# Patient Record
Sex: MALE | Race: WHITE | Employment: PART TIME | URBAN - NONMETROPOLITAN AREA
[De-identification: names, ages, dates, MRNs, and addresses within clinical notes are randomized per-mention and may not be internally consistent; named-entity substitution may affect disease eponyms.]

---

## 2017-01-21 DIAGNOSIS — E78.00 PURE HYPERCHOLESTEROLEMIA: ICD-10-CM

## 2017-01-23 RX ORDER — ATORVASTATIN CALCIUM 10 MG/1
10 TABLET, FILM COATED ORAL DAILY
Qty: 30 TABLET | Refills: 0 | Status: SHIPPED | OUTPATIENT
Start: 2017-01-23 | End: 2017-02-23 | Stop reason: SDUPTHER

## 2017-01-31 ENCOUNTER — OFFICE VISIT (OUTPATIENT)
Dept: FAMILY MEDICINE CLINIC | Age: 27
End: 2017-01-31

## 2017-01-31 VITALS
WEIGHT: 202 LBS | BODY MASS INDEX: 33.66 KG/M2 | SYSTOLIC BLOOD PRESSURE: 124 MMHG | HEART RATE: 108 BPM | HEIGHT: 65 IN | OXYGEN SATURATION: 98 % | DIASTOLIC BLOOD PRESSURE: 74 MMHG

## 2017-01-31 DIAGNOSIS — F60.9 PERSONALITY DISORDER (HCC): ICD-10-CM

## 2017-01-31 DIAGNOSIS — Z13.1 DIABETES MELLITUS SCREENING: Primary | ICD-10-CM

## 2017-01-31 DIAGNOSIS — F33.2 MAJOR DEPRESSIVE DISORDER, RECURRENT SEVERE WITHOUT PSYCHOTIC FEATURES (HCC): ICD-10-CM

## 2017-01-31 DIAGNOSIS — K59.02 CONSTIPATION DUE TO OUTLET DYSFUNCTION: ICD-10-CM

## 2017-01-31 DIAGNOSIS — E78.2 MIXED HYPERLIPIDEMIA: ICD-10-CM

## 2017-01-31 DIAGNOSIS — F39 MOOD DISORDER (HCC): ICD-10-CM

## 2017-01-31 DIAGNOSIS — R15.9 ENCOPRESIS WITH CONSTIPATION AND OVERFLOW INCONTINENCE: ICD-10-CM

## 2017-01-31 LAB
ALBUMIN SERPL-MCNC: 4.6 G/DL (ref 3.4–5)
ALP BLD-CCNC: 62 U/L (ref 40–129)
ALT SERPL-CCNC: 65 U/L (ref 10–40)
AST SERPL-CCNC: 24 U/L (ref 15–37)
BILIRUB SERPL-MCNC: 0.8 MG/DL (ref 0–1)
BILIRUBIN DIRECT: <0.2 MG/DL (ref 0–0.3)
BILIRUBIN, INDIRECT: ABNORMAL MG/DL (ref 0–1)
CHOLESTEROL, TOTAL: 135 MG/DL (ref 0–199)
GLUCOSE BLD-MCNC: 91 MG/DL (ref 70–99)
HDLC SERPL-MCNC: 46 MG/DL (ref 40–60)
LDL CHOLESTEROL CALCULATED: 68 MG/DL
TOTAL PROTEIN: 6.7 G/DL (ref 6.4–8.2)
TRIGL SERPL-MCNC: 105 MG/DL (ref 0–150)
VLDLC SERPL CALC-MCNC: 21 MG/DL

## 2017-01-31 PROCEDURE — G8427 DOCREV CUR MEDS BY ELIG CLIN: HCPCS | Performed by: FAMILY MEDICINE

## 2017-01-31 PROCEDURE — 36415 COLL VENOUS BLD VENIPUNCTURE: CPT | Performed by: FAMILY MEDICINE

## 2017-01-31 PROCEDURE — G8419 CALC BMI OUT NRM PARAM NOF/U: HCPCS | Performed by: FAMILY MEDICINE

## 2017-01-31 PROCEDURE — 99214 OFFICE O/P EST MOD 30 MIN: CPT | Performed by: FAMILY MEDICINE

## 2017-01-31 PROCEDURE — 4004F PT TOBACCO SCREEN RCVD TLK: CPT | Performed by: FAMILY MEDICINE

## 2017-01-31 PROCEDURE — G0444 DEPRESSION SCREEN ANNUAL: HCPCS | Performed by: FAMILY MEDICINE

## 2017-01-31 PROCEDURE — G8484 FLU IMMUNIZE NO ADMIN: HCPCS | Performed by: FAMILY MEDICINE

## 2017-01-31 RX ORDER — DEXTROAMPHETAMINE SACCHARATE, AMPHETAMINE ASPARTATE, DEXTROAMPHETAMINE SULFATE AND AMPHETAMINE SULFATE 7.5; 7.5; 7.5; 7.5 MG/1; MG/1; MG/1; MG/1
30 TABLET ORAL DAILY
COMMUNITY

## 2017-01-31 ASSESSMENT — PATIENT HEALTH QUESTIONNAIRE - PHQ9
SUM OF ALL RESPONSES TO PHQ QUESTIONS 1-9: 11
6. FEELING BAD ABOUT YOURSELF - OR THAT YOU ARE A FAILURE OR HAVE LET YOURSELF OR YOUR FAMILY DOWN: 2
9. THOUGHTS THAT YOU WOULD BE BETTER OFF DEAD, OR OF HURTING YOURSELF: 0
7. TROUBLE CONCENTRATING ON THINGS, SUCH AS READING THE NEWSPAPER OR WATCHING TELEVISION: 1
5. POOR APPETITE OR OVEREATING: 0
8. MOVING OR SPEAKING SO SLOWLY THAT OTHER PEOPLE COULD HAVE NOTICED. OR THE OPPOSITE, BEING SO FIGETY OR RESTLESS THAT YOU HAVE BEEN MOVING AROUND A LOT MORE THAN USUAL: 0
SUM OF ALL RESPONSES TO PHQ9 QUESTIONS 1 & 2: 5
3. TROUBLE FALLING OR STAYING ASLEEP: 0
10. IF YOU CHECKED OFF ANY PROBLEMS, HOW DIFFICULT HAVE THESE PROBLEMS MADE IT FOR YOU TO DO YOUR WORK, TAKE CARE OF THINGS AT HOME, OR GET ALONG WITH OTHER PEOPLE: 2
2. FEELING DOWN, DEPRESSED OR HOPELESS: 3
1. LITTLE INTEREST OR PLEASURE IN DOING THINGS: 2
4. FEELING TIRED OR HAVING LITTLE ENERGY: 3

## 2017-02-16 ENCOUNTER — TELEPHONE (OUTPATIENT)
Dept: FAMILY MEDICINE CLINIC | Age: 27
End: 2017-02-16

## 2017-02-23 DIAGNOSIS — E78.00 PURE HYPERCHOLESTEROLEMIA: ICD-10-CM

## 2017-02-24 RX ORDER — ATORVASTATIN CALCIUM 10 MG/1
10 TABLET, FILM COATED ORAL DAILY
Qty: 30 TABLET | Refills: 11 | Status: SHIPPED | OUTPATIENT
Start: 2017-02-24 | End: 2018-02-17 | Stop reason: SDUPTHER

## 2017-04-12 ENCOUNTER — OFFICE VISIT (OUTPATIENT)
Dept: FAMILY MEDICINE CLINIC | Age: 27
End: 2017-04-12

## 2017-04-12 VITALS
SYSTOLIC BLOOD PRESSURE: 118 MMHG | DIASTOLIC BLOOD PRESSURE: 84 MMHG | OXYGEN SATURATION: 97 % | HEIGHT: 65 IN | WEIGHT: 206 LBS | HEART RATE: 98 BPM | BODY MASS INDEX: 34.32 KG/M2

## 2017-04-12 DIAGNOSIS — T14.8XXA MUSCLE STRAIN: Primary | ICD-10-CM

## 2017-04-12 PROCEDURE — G8417 CALC BMI ABV UP PARAM F/U: HCPCS | Performed by: NURSE PRACTITIONER

## 2017-04-12 PROCEDURE — 99213 OFFICE O/P EST LOW 20 MIN: CPT | Performed by: NURSE PRACTITIONER

## 2017-04-12 PROCEDURE — 4004F PT TOBACCO SCREEN RCVD TLK: CPT | Performed by: NURSE PRACTITIONER

## 2017-04-12 PROCEDURE — G8427 DOCREV CUR MEDS BY ELIG CLIN: HCPCS | Performed by: NURSE PRACTITIONER

## 2017-04-12 RX ORDER — FLUOXETINE HYDROCHLORIDE 20 MG/1
20 CAPSULE ORAL DAILY
Refills: 2 | COMMUNITY
Start: 2017-03-29

## 2017-04-12 RX ORDER — ARIPIPRAZOLE 15 MG/1
15 TABLET ORAL DAILY
COMMUNITY
Start: 2017-04-10 | End: 2017-04-12 | Stop reason: SDUPTHER

## 2017-04-12 RX ORDER — IBUPROFEN 400 MG/1
400 TABLET ORAL EVERY 6 HOURS PRN
Qty: 120 TABLET | Refills: 3 | Status: SHIPPED | OUTPATIENT
Start: 2017-04-12 | End: 2017-09-29 | Stop reason: SDUPTHER

## 2017-04-12 RX ORDER — HYDROXYZINE PAMOATE 50 MG/1
50 CAPSULE ORAL DAILY
Refills: 0 | COMMUNITY
Start: 2017-03-15 | End: 2019-12-09

## 2017-04-12 ASSESSMENT — ENCOUNTER SYMPTOMS
WHEEZING: 0
CONSTIPATION: 0
COUGH: 0
NAUSEA: 0
DIARRHEA: 0
VOMITING: 0
BLURRED VISION: 0

## 2017-08-01 ENCOUNTER — OFFICE VISIT (OUTPATIENT)
Dept: FAMILY MEDICINE CLINIC | Age: 27
End: 2017-08-01

## 2017-08-01 VITALS
DIASTOLIC BLOOD PRESSURE: 74 MMHG | HEIGHT: 65 IN | WEIGHT: 205.2 LBS | HEART RATE: 86 BPM | OXYGEN SATURATION: 98 % | BODY MASS INDEX: 34.19 KG/M2 | SYSTOLIC BLOOD PRESSURE: 124 MMHG

## 2017-08-01 DIAGNOSIS — K59.02 CONSTIPATION DUE TO OUTLET DYSFUNCTION: ICD-10-CM

## 2017-08-01 DIAGNOSIS — Z72.0 TOBACCO ABUSE: Primary | ICD-10-CM

## 2017-08-01 DIAGNOSIS — F33.2 MAJOR DEPRESSIVE DISORDER, RECURRENT SEVERE WITHOUT PSYCHOTIC FEATURES (HCC): ICD-10-CM

## 2017-08-01 DIAGNOSIS — J30.89 NON-SEASONAL ALLERGIC RHINITIS, UNSPECIFIED ALLERGIC RHINITIS TRIGGER: ICD-10-CM

## 2017-08-01 DIAGNOSIS — Z23 NEED FOR PROPHYLACTIC VACCINATION AGAINST STREPTOCOCCUS PNEUMONIAE (PNEUMOCOCCUS): ICD-10-CM

## 2017-08-01 DIAGNOSIS — F70 MENTAL RETARDATION, MILD (I.Q. 50-70): ICD-10-CM

## 2017-08-01 DIAGNOSIS — E78.2 MIXED HYPERLIPIDEMIA: ICD-10-CM

## 2017-08-01 DIAGNOSIS — Z11.4 SCREENING FOR HIV WITHOUT PRESENCE OF RISK FACTORS: ICD-10-CM

## 2017-08-01 DIAGNOSIS — F60.9 PERSONALITY DISORDER (HCC): ICD-10-CM

## 2017-08-01 DIAGNOSIS — R15.9 ENCOPRESIS WITH CONSTIPATION AND OVERFLOW INCONTINENCE: ICD-10-CM

## 2017-08-01 PROCEDURE — 99214 OFFICE O/P EST MOD 30 MIN: CPT | Performed by: FAMILY MEDICINE

## 2017-08-01 PROCEDURE — 4004F PT TOBACCO SCREEN RCVD TLK: CPT | Performed by: FAMILY MEDICINE

## 2017-08-01 PROCEDURE — G8427 DOCREV CUR MEDS BY ELIG CLIN: HCPCS | Performed by: FAMILY MEDICINE

## 2017-08-01 PROCEDURE — G8417 CALC BMI ABV UP PARAM F/U: HCPCS | Performed by: FAMILY MEDICINE

## 2017-08-01 RX ORDER — LORATADINE 10 MG/1
10 TABLET ORAL DAILY
Qty: 30 TABLET | Refills: 11 | Status: SHIPPED | OUTPATIENT
Start: 2017-08-01 | End: 2018-07-07 | Stop reason: SDUPTHER

## 2017-08-01 RX ORDER — ARIPIPRAZOLE 15 MG/1
15 TABLET ORAL DAILY
COMMUNITY

## 2017-09-29 DIAGNOSIS — T14.8XXA MUSCLE STRAIN: ICD-10-CM

## 2017-09-29 RX ORDER — IBUPROFEN 400 MG/1
400 TABLET ORAL EVERY 6 HOURS PRN
Qty: 120 TABLET | Refills: 3 | Status: SHIPPED | OUTPATIENT
Start: 2017-09-29

## 2017-11-30 ENCOUNTER — OFFICE VISIT (OUTPATIENT)
Dept: FAMILY MEDICINE CLINIC | Age: 27
End: 2017-11-30

## 2017-11-30 VITALS
WEIGHT: 210 LBS | BODY MASS INDEX: 34.99 KG/M2 | SYSTOLIC BLOOD PRESSURE: 114 MMHG | HEART RATE: 100 BPM | DIASTOLIC BLOOD PRESSURE: 82 MMHG | OXYGEN SATURATION: 98 % | HEIGHT: 65 IN

## 2017-11-30 DIAGNOSIS — E66.09 CLASS 1 OBESITY DUE TO EXCESS CALORIES WITH SERIOUS COMORBIDITY AND BODY MASS INDEX (BMI) OF 34.0 TO 34.9 IN ADULT: ICD-10-CM

## 2017-11-30 DIAGNOSIS — Z13.220 SCREENING FOR CHOLESTEROL LEVEL: ICD-10-CM

## 2017-11-30 DIAGNOSIS — E78.2 MIXED HYPERLIPIDEMIA: ICD-10-CM

## 2017-11-30 DIAGNOSIS — Z13.29 SCREENING FOR THYROID DISORDER: ICD-10-CM

## 2017-11-30 DIAGNOSIS — Z13.21 ENCOUNTER FOR VITAMIN DEFICIENCY SCREENING: ICD-10-CM

## 2017-11-30 DIAGNOSIS — Z01.00 ENCOUNTER FOR VISION SCREENING: ICD-10-CM

## 2017-11-30 DIAGNOSIS — Z71.6 TOBACCO ABUSE COUNSELING: Primary | ICD-10-CM

## 2017-11-30 DIAGNOSIS — Z11.4 SCREENING FOR HIV WITHOUT PRESENCE OF RISK FACTORS: ICD-10-CM

## 2017-11-30 DIAGNOSIS — Z13.1 SCREENING FOR DIABETES MELLITUS: ICD-10-CM

## 2017-11-30 DIAGNOSIS — Z11.1 SCREENING FOR TUBERCULOSIS: ICD-10-CM

## 2017-11-30 LAB
A/G RATIO: 2.4 (ref 1.1–2.2)
ALBUMIN SERPL-MCNC: 4.6 G/DL (ref 3.4–5)
ALP BLD-CCNC: 61 U/L (ref 40–129)
ALT SERPL-CCNC: 44 U/L (ref 10–40)
ANION GAP SERPL CALCULATED.3IONS-SCNC: 16 MMOL/L (ref 3–16)
AST SERPL-CCNC: 22 U/L (ref 15–37)
BASOPHILS ABSOLUTE: 0 K/UL (ref 0–0.2)
BASOPHILS RELATIVE PERCENT: 0.3 %
BILIRUB SERPL-MCNC: 0.8 MG/DL (ref 0–1)
BUN BLDV-MCNC: 15 MG/DL (ref 7–20)
CALCIUM SERPL-MCNC: 9.4 MG/DL (ref 8.3–10.6)
CHLORIDE BLD-SCNC: 103 MMOL/L (ref 99–110)
CHOLESTEROL, TOTAL: 148 MG/DL (ref 0–199)
CO2: 25 MMOL/L (ref 21–32)
CREAT SERPL-MCNC: 1.1 MG/DL (ref 0.9–1.3)
EOSINOPHILS ABSOLUTE: 0 K/UL (ref 0–0.6)
EOSINOPHILS RELATIVE PERCENT: 0.1 %
FOLATE: 11.67 NG/ML (ref 4.78–24.2)
GFR AFRICAN AMERICAN: >60
GFR NON-AFRICAN AMERICAN: >60
GLOBULIN: 1.9 G/DL
GLUCOSE BLD-MCNC: 87 MG/DL (ref 70–99)
HCT VFR BLD CALC: 48 % (ref 40.5–52.5)
HDLC SERPL-MCNC: 55 MG/DL (ref 40–60)
HEMOGLOBIN: 16.3 G/DL (ref 13.5–17.5)
LDL CHOLESTEROL CALCULATED: 70 MG/DL
LYMPHOCYTES ABSOLUTE: 1.2 K/UL (ref 1–5.1)
LYMPHOCYTES RELATIVE PERCENT: 24.7 %
MAGNESIUM: 2.2 MG/DL (ref 1.8–2.4)
MCH RBC QN AUTO: 26.7 PG (ref 26–34)
MCHC RBC AUTO-ENTMCNC: 33.9 G/DL (ref 31–36)
MCV RBC AUTO: 78.7 FL (ref 80–100)
MONOCYTES ABSOLUTE: 0.4 K/UL (ref 0–1.3)
MONOCYTES RELATIVE PERCENT: 8.3 %
NEUTROPHILS ABSOLUTE: 3.2 K/UL (ref 1.7–7.7)
NEUTROPHILS RELATIVE PERCENT: 66.6 %
PDW BLD-RTO: 13.6 % (ref 12.4–15.4)
PLATELET # BLD: 230 K/UL (ref 135–450)
PMV BLD AUTO: 8.2 FL (ref 5–10.5)
POTASSIUM SERPL-SCNC: 4.1 MMOL/L (ref 3.5–5.1)
RBC # BLD: 6.1 M/UL (ref 4.2–5.9)
SODIUM BLD-SCNC: 144 MMOL/L (ref 136–145)
T4 FREE: 1.3 NG/DL (ref 0.9–1.8)
TOTAL PROTEIN: 6.5 G/DL (ref 6.4–8.2)
TRIGL SERPL-MCNC: 115 MG/DL (ref 0–150)
TSH SERPL DL<=0.05 MIU/L-ACNC: 0.78 UIU/ML (ref 0.27–4.2)
VITAMIN B-12: 599 PG/ML (ref 211–911)
VLDLC SERPL CALC-MCNC: 23 MG/DL
WBC # BLD: 4.8 K/UL (ref 4–11)

## 2017-11-30 PROCEDURE — 99214 OFFICE O/P EST MOD 30 MIN: CPT | Performed by: NURSE PRACTITIONER

## 2017-11-30 PROCEDURE — 36415 COLL VENOUS BLD VENIPUNCTURE: CPT | Performed by: NURSE PRACTITIONER

## 2017-11-30 NOTE — PATIENT INSTRUCTIONS
Patient Education        Stopping Smoking: Care Instructions  Your Care Instructions  Cigarette smokers crave the nicotine in cigarettes. Giving it up is much harder than simply changing a habit. Your body has to stop craving the nicotine. It is hard to quit, but you can do it. There are many tools that people use to quit smoking. You may find that combining tools works best for you. There are several steps to quitting. First you get ready to quit. Then you get support to help you. After that, you learn new skills and behaviors to become a nonsmoker. For many people, a necessary step is getting and using medicine. Your doctor will help you set up the plan that best meets your needs. You may want to attend a smoking cessation program to help you quit smoking. When you choose a program, look for one that has proven success. Ask your doctor for ideas. You will greatly increase your chances of success if you take medicine as well as get counseling or join a cessation program.  Some of the changes you feel when you first quit tobacco are uncomfortable. Your body will miss the nicotine at first, and you may feel short-tempered and grumpy. You may have trouble sleeping or concentrating. Medicine can help you deal with these symptoms. You may struggle with changing your smoking habits and rituals. The last step is the tricky one: Be prepared for the smoking urge to continue for a time. This is a lot to deal with, but keep at it. You will feel better. Follow-up care is a key part of your treatment and safety. Be sure to make and go to all appointments, and call your doctor if you are having problems. Its also a good idea to know your test results and keep a list of the medicines you take. How can you care for yourself at home? · Ask your family, friends, and coworkers for support. You have a better chance of quitting if you have help and support.   · Join a support group, such as Nicotine Anonymous, for people who are trying to quit smoking. · Consider signing up for a smoking cessation program, such as the American [employer] Association's Freedom from Smoking program.  · Set a quit date. Pick your date carefully so that it is not right in the middle of a big deadline or stressful time. Once you quit, do not even take a puff. Get rid of all ashtrays and lighters after your last cigarette. Clean your house and your clothes so that they do not smell of smoke. · Learn how to be a nonsmoker. Think about ways you can avoid those things that make you reach for a cigarette. ¨ Avoid situations that put you at greatest risk for smoking. For some people, it is hard to have a drink with friends without smoking. For others, they might skip a coffee break with coworkers who smoke. ¨ Change your daily routine. Take a different route to work or eat a meal in a different place. · Cut down on stress. Calm yourself or release tension by doing an activity you enjoy, such as reading a book, taking a hot bath, or gardening. · Talk to your doctor or pharmacist about nicotine replacement therapy, which replaces the nicotine in your body. You still get nicotine but you do not use tobacco. Nicotine replacement products help you slowly reduce the amount of nicotine you need. These products come in several forms, many of them available over-the-counter:  ¨ Nicotine patches  ¨ Nicotine gum and lozenges  ¨ Nicotine inhaler  · Ask your doctor about bupropion (Wellbutrin) or varenicline (Chantix), which are prescription medicines. They do not contain nicotine. They help you by reducing withdrawal symptoms, such as stress and anxiety. · Some people find hypnosis, acupuncture, and massage helpful for ending the smoking habit. · Eat a healthy diet and get regular exercise. Having healthy habits will help your body move past its craving for nicotine. · Be prepared to keep trying. Most people are not successful the first few times they try to quit.  Do not get unhealthy diet, or you drink too much alcohol or use tobacco products. Follow-up care is a key part of your treatment and safety. Be sure to make and go to all appointments, and call your doctor if you are having problems. It's also a good idea to know your test results and keep a list of the medicines you take. How can you care for yourself at home? · Practice healthy eating habits. This includes eating plenty of fruits, vegetables, whole grains, lean protein, and low-fat dairy. · If your doctor recommends it, get more exercise. Walking is a good choice. Bit by bit, increase the amount you walk every day. Try for at least 30 minutes on most days of the week. · Do not smoke. Smoking can increase your risk for health problems. If you need help quitting, talk to your doctor about stop-smoking programs and medicines. These can increase your chances of quitting for good. · Limit alcohol to 2 drinks a day for men and 1 drink a day for women. Too much alcohol can cause health problems. If you have a BMI higher than 25  · Your doctor may do other tests to check your risk for weight-related health problems. This may include measuring the distance around your waist. A waist measurement of more than 40 inches in men or 35 inches in women can increase the risk of weight-related health problems. · Talk with your doctor about steps you can take to stay healthy or improve your health. You may need to make lifestyle changes to lose weight and stay healthy, such as changing your diet and getting regular exercise. If you have a BMI lower than 18.5  · Your doctor may do other tests to check your risk for health problems. · Talk with your doctor about steps you can take to stay healthy or improve your health. You may need to make lifestyle changes to gain or maintain weight and stay healthy, such as getting more healthy foods in your diet and doing exercises to build muscle. Where can you learn more?   Go to

## 2017-11-30 NOTE — PROGRESS NOTES
[facility] [place] SPECIALTY [facility] PHYSICIAN PRACTICES  [facility]  [address] [place] [id number]  Dept: [phone]  Dept Fax: [phone]    Sylvester Avelar is a 23 y.o. male who presents today for his medical conditions/complaints as noted below. Sylvester Avelar is c/o of Employment Physical        HPI:     Chief Complaint   Patient presents with    Employment Physical       HPI    Mr. Marisol Gaxiola presents for a work physical.  He states he hates working at [employer]; however, he is able to do the work well without any concerns. He denies having any concerns today. He has an eye appointment next week. He is not wearing his glasses today. He denies having any concerns today. He is asking for a TB test for work to be performed. He states he is still smoking and he is wanting to cut down on smoking. He does not want to be placed on nicotine replacement stating, \"It causes me to get headaches. \"     Past Medical History:   Diagnosis Date    Acid reflux     ADHD     Bipolar affect, depressed (HCC)     Hyperlipidemia     Obesity     OCD (obsessive compulsive disorder)     Schizo affective schizophrenia (HCC)     Seizures ([facility] [address]       Past Surgical History:   Procedure Laterality Date    TOOTH EXTRACTION      UPPER GASTROINTESTINAL ENDOSCOPY         Family History   Problem Relation Age of Onset    Adopted:  Yes    Mental Illness Mother     Substance Abuse Mother     Mental Illness Father     Substance Abuse Father        Social History   Substance Use Topics    Smoking status: Current Some Day Smoker     Packs/day: 0.10     Types: Cigarettes     Last attempt to quit: 1/1/2016    Smokeless tobacco: Former User     Types: Snuff    Alcohol use No      Current Outpatient Prescriptions   Medication Sig Dispense Refill    ibuprofen (ADVIL;MOTRIN) 400 MG tablet Take 1 tablet by mouth every 6 hours as needed for Pain 120 tablet 3    ARIPiprazole (ABILIFY) 15 MG tablet Take 15 mg by mouth daily Take PRN in addition to the 30 mg tablet      loratadine (CLARITIN) 10 MG tablet Take 1 tablet by mouth daily 30 tablet 11    FLUoxetine (PROZAC) 20 MG capsule Take 20 mg by mouth daily  2    hydrOXYzine (VISTARIL) 50 MG capsule Take 50 mg by mouth daily  0    atorvastatin (LIPITOR) 10 MG tablet Take 1 tablet by mouth daily 30 tablet 11    amphetamine-dextroamphetamine (ADDERALL, 30MG,) 30 MG tablet Take 30 mg by mouth daily  Ordered by Dr. Emery Mccoy  In 12/2016 .  traZODone (DESYREL) 100 MG tablet Take 100 mg by mouth nightly      nystatin (MYCOSTATIN) 961572 UNIT/GM cream Apply topically 2 times daily. 60 g 3    diazepam (VALIUM) 10 MG tablet Take 10 mg by mouth daily as needed for Anxiety      ARIPiprazole (ABILIFY) 30 MG tablet Take 30 mg by mouth daily.  docusate sodium (COLACE) 100 MG capsule 2 at hs 60 capsule 11     No current facility-administered medications for this visit. Allergies   Allergen Reactions    Molds & Smuts        Subjective:      Review of Systems   Constitutional: Negative. HENT: Negative. Eyes: Positive for visual disturbance (Double vision- chronic when not wearing glasses). Negative for photophobia, pain, discharge, redness and itching. Respiratory: Positive for cough. Negative for apnea, choking, chest tightness, shortness of breath, wheezing and stridor. Cardiovascular: Negative. Gastrointestinal: Positive for constipation (Ocassional ). Negative for abdominal distention, abdominal pain, anal bleeding, blood in stool, diarrhea, nausea, rectal pain and vomiting. Genitourinary: Negative. Musculoskeletal: Negative. Skin: Negative. Allergic/Immunologic: Positive for environmental allergies. Neurological: Positive for seizures (Last seizure was in February- \"stares into space\") and headaches (Chronic- unchanged).  Negative for dizziness, tremors, syncope, facial asymmetry, speech difficulty, weakness, light-headedness and numbness. Psychiatric/Behavioral: Positive for agitation, behavioral problems, decreased concentration, dysphoric mood and sleep disturbance. Negative for confusion, hallucinations, self-injury and suicidal ideas. The patient is nervous/anxious and is hyperactive. Developmental delay  Bipolar           Objective:     Vitals:    11/30/17 0841   BP: 114/82   Site: Left Arm   Position: Sitting   Cuff Size: Medium Adult   Pulse: 100   SpO2: 98%   Weight: 210 lb (95.3 kg)   Height: 5' 5\" (1.651 m)     Wt Readings from Last 3 Encounters:   11/30/17 210 lb (95.3 kg)   11/09/17 200 lb (90.7 kg)   08/01/17 205 lb 3.2 oz (93.1 kg)     Temp Readings from Last 3 Encounters:   11/09/17 97.7 °F (36.5 °C) (Tympanic)   05/18/15 98.8 °F (37.1 °C) (Oral)   04/28/15 97.8 °F (36.6 °C) (Oral)     BP Readings from Last 3 Encounters:   11/30/17 114/82   11/09/17 138/82   08/01/17 124/74     Pulse Readings from Last 3 Encounters:   11/30/17 100   11/09/17 92   08/01/17 86     Physical Exam   Constitutional: He is oriented to person, place, and time. He appears well-developed and well-nourished. No distress. HENT:   Head: Normocephalic and atraumatic. Right Ear: External ear normal.   Left Ear: External ear normal.   Nose: Nose normal.   Mouth/Throat: Oropharynx is clear and moist.   Eyes: Conjunctivae and EOM are normal. Pupils are equal, round, and reactive to light. Right eye exhibits no discharge. Left eye exhibits no discharge. Neck: Normal range of motion. Neck supple. No tracheal deviation present. Cardiovascular: Normal rate, regular rhythm, normal heart sounds and intact distal pulses. Exam reveals no gallop and no friction rub. No murmur heard. Pulmonary/Chest: Effort normal and breath sounds normal. No respiratory distress. He has no wheezes. He has no rales. He exhibits no tenderness. Abdominal: Soft. Bowel sounds are normal. He exhibits no distension and no mass. There is no tenderness.  There is no Currently works part-time    6. Class 1 obesity due to excess calories with serious comorbidity and body mass index (BMI) of 34.0 to 34.9 in adult        Magno Renee was seen today for employment physical.     Will complete once physical once eye exam performed and he has his TB test performed. He verbalizes understanding. Will get fasting blood work today as he is fasting for his work physical.     Diagnoses and all orders for this visit:    Tobacco abuse counseling    Screening for cholesterol level  -     Lipid Panel    Encounter for vitamin deficiency screening  -     Magnesium  -     Vitamin B12 & Folate    Screening for thyroid disorder  -     TSH without Reflex  -     T4, Free    Mixed hyperlipidemia  -     Lipid Panel  -     CBC Auto Differential  -     Comprehensive Metabolic Panel    Screening for diabetes mellitus    Screening for HIV without presence of risk factors  -     HIV Screen    Screening for tuberculosis  -     Cancel: Mantoux testing  -     Mantoux testing; Future    Encounter for vision screening  -     311[address] - [place] VISUAL SCREENING TEST, MIMI    Currently works part-time    Class 1 obesity due to excess calories with serious comorbidity and body mass index (BMI) of 34.0 to 34.9 in adult        Return if symptoms worsen or fail to improve, for Follow up with Dr. Bill Hernandez as instructed for [facility] . Patient should call the office immediately with new or ongoing signs or symptoms or worsening, or proceed to the emergency room. If you are on medications which could impair your senses, you are at risk of weakness, falls, dizziness, or drowsiness. You should be careful during activities which could place you at risk of harm, such as climbing, using stairs, operating machinery, or driving vehicles. If you feel you cannot safely do these activities, you should request others to help you, or avoid the activities altogether.  If you are drowsy for any other reason, you should use the same precautions as food group and, as a result, miss getting the nutrients they need. So, it is important to pay attention not only to what you eat but also to what you are missing from your diet. You can eat a healthy, balanced diet by making a few small changes. Follow-up care is a key part of your treatment and safety. Be sure to make and go to all appointments, and call your doctor if you are having problems. It's also a good idea to know your test results and keep a list of the medicines you take. How can you care for yourself at home? Look at what you eat  · Keep a food diary for a week or two and record everything you eat or drink. Track the number of servings you eat from each food group. · For a balanced diet every day, eat a variety of:  ¨ 6 or more ounce-equivalents of grains, such as cereals, breads, crackers, rice, or pasta, every day. An ounce-equivalent is 1 slice of bread, 1 cup of ready-to-eat cereal, or ½ cup of cooked rice, cooked pasta, or cooked cereal.  ¨ 2½ cups of vegetables, especially:  § Dark-green vegetables such as broccoli and spinach. § Orange vegetables such as carrots and sweet potatoes. § Dry beans (such as colby and kidney beans) and peas (such as lentils). ¨ 2 cups of fresh, frozen, or canned fruit. A small apple or 1 banana or orange equals 1 cup. ¨ 3 cups of nonfat or low-fat milk, yogurt, or other milk products. ¨ 5½ ounces of meat and beans, such as chicken, fish, lean meat, beans, nuts, and seeds. One egg, 1 tablespoon of peanut butter, ½ ounce nuts or seeds, or ¼ cup of cooked beans equals 1 ounce of meat. · Learn how to read food labels for serving sizes and ingredients. Fast-food and convenience-food meals often contain few or no fruits or vegetables. Make sure you eat some fruits and vegetables to make the meal more nutritious. · Look at your food diary. For each food group, add up what you have eaten and then divide the total by the number of days.  This will give you an idea of how much you are eating from each food group. See if you can find some ways to change your diet to make it more healthy. Start small  · Do not try to make dramatic changes to your diet all at once. You might feel that you are missing out on your favorite foods and then be more likely to fail. · Start slowly, and gradually change your habits. Try some of the following:  ¨ Use whole wheat bread instead of white bread. ¨ Use nonfat or low-fat milk instead of whole milk. ¨ Eat brown rice instead of white rice, and eat whole wheat pasta instead of white-flour pasta. ¨ Try low-fat cheeses and low-fat yogurt. ¨ Add more fruits and vegetables to meals and have them for snacks. ¨ Add lettuce, tomato, cucumber, and onion to sandwiches. ¨ Add fruit to yogurt and cereal.  Enjoy food  · You can still eat your favorite foods. You just may need to eat less of them. If your favorite foods are high in fat, salt, and sugar, limit how often you eat them, but do not cut them out entirely. · Eat a wide variety of foods. Make healthy choices when eating out  · The type of restaurant you choose can help you make healthy choices. Even fast-food chains are now offering more low-fat or healthier choices on the menu. · Choose smaller portions, or take half of your meal home. · When eating out, try:  ¨ A veggie pizza with a whole wheat crust or grilled chicken (instead of sausage or pepperoni). ¨ Pasta with roasted vegetables, grilled chicken, or marinara sauce instead of cream sauce. ¨ A vegetable wrap or grilled chicken wrap. ¨ Broiled or poached food instead of fried or breaded items. Make healthy choices easy  · Buy packaged, prewashed, ready-to-eat fresh vegetables and fruits, such as baby carrots, salad mixes, and chopped or shredded broccoli and cauliflower. · Buy packaged, presliced fruits, such as melon or pineapple. · Choose 100% fruit or vegetable juice instead of soda.  Limit juice intake to 4 to 6 oz (½ to ¾ cup) a day.      Education given to patient in office and in AVS on Physical Activity:     The types of physical activity that can help you get fit and stay healthy include:  · Aerobic or \"cardio\" activities that make your heart beat faster and make you breathe harder, such as brisk walking, riding a bike, or running. Aerobic activities strengthen your heart and lungs and build up your endurance. · Strength training activities that make your muscles work against, or \"resist,\" something, such as lifting weights or doing push-ups. These activities help tone and strengthen your muscles. · Stretches that allow you to move your joints and muscles through their full range of motion. Stretching helps you be more flexible and avoid injury. What are the benefits of physical activity? Being active is one of the best things you can do to get fit and stay healthy. It helps you to:  · Feel stronger and have more energy to do all the things you like to do. · Focus better at school or work and perform better in sports. · Feel, think, and sleep better. · Reach and stay at a healthy weight. · Lose fat and build lean muscle. · Lower your risk for serious health problems. · Keep your bones, muscles, and joints strong. Being fit lets you do more physical activity. And it lets you work out harder without as much effort. How can you make physical activity part of your life? Get at least 30 minutes of exercise on most days of the week. Walking is a good choice. You also may want to do other activities, such as running, swimming, cycling, or playing tennis or team sports. Pick activities that you likeones that make your heart beat faster, your muscles stronger, and your muscles and joints more flexible. If you find more than one thing you like doing, do them all. You don't have to do the same thing every day. Get your heart pumping every day.  Any activity that makes your heart beat faster and keeps it at that rate for a while counts. Here are some great ways to get your heart beating faster:  · Go for a brisk walk, run, or bike ride. · Go for a hike or swim. · Go in-line skating. · Play a game of touch football, basketball, or soccer. · Ride a bike. · Play tennis or racquetball. · Climb stairs. Even some household chores can be aerobicjust do them at a faster pace. Vacuuming, raking or mowing the lawn, sweeping the garage, and washing and waxing the car all can help get your heart rate up. Strengthen your muscles during the week. You don't have to lift heavy weights or grow big, bulky muscles to get stronger. Doing a few simple activities that make your muscles work against, or \"resist,\" something can help you get stronger. For example, you can:  · Do push-ups or sit-ups, which use your own body weight as resistance. · Lift weights or dumbbells or use stretch bands at home or in a gym or community center. Stretch your muscles often. Stretching will help you as you become more active. It can help you stay flexible, loosen tight muscles, and avoid injury. It can also help improve your balance and posture and can be a great way to relax. Be sure to stretch the muscles you'll be using when you work out. It's best to warm your muscles slightly before you stretch them. Walk or do some other light aerobic activity for a few minutes, and then start stretching. When you stretch your muscles:  · Do it slowly. Stretching is not about going fast or making sudden movements. · Don't push or bounce during a stretch. · Hold each stretch for at least 15 to 30 seconds, if you can. You should feel a stretch in the muscle, but not pain. · Breathe out as you do the stretch. Then breathe in as you hold the stretch. Don't hold your breath. If you're worried about how more activity might affect your health, have a checkup before you start. Follow any special advice your doctor gives you for getting a smart start.

## 2017-12-01 LAB — HIV-1 AND HIV-2 ANTIBODIES: NORMAL

## 2017-12-02 ASSESSMENT — ENCOUNTER SYMPTOMS
PHOTOPHOBIA: 0
BLOOD IN STOOL: 0
EYE ITCHING: 0
WHEEZING: 0
RECTAL PAIN: 0
APNEA: 0
CONSTIPATION: 1
SHORTNESS OF BREATH: 0
ABDOMINAL PAIN: 0
EYE PAIN: 0
CHEST TIGHTNESS: 0
ABDOMINAL DISTENTION: 0
VOMITING: 0
COUGH: 1
NAUSEA: 0
DIARRHEA: 0
CHOKING: 0
STRIDOR: 0
ANAL BLEEDING: 0
EYE DISCHARGE: 0
EYE REDNESS: 0

## 2017-12-04 ENCOUNTER — NURSE ONLY (OUTPATIENT)
Dept: FAMILY MEDICINE CLINIC | Age: 27
End: 2017-12-04

## 2017-12-04 DIAGNOSIS — Z11.1 SCREENING FOR TUBERCULOSIS: ICD-10-CM

## 2017-12-04 DIAGNOSIS — Z11.1 ENCOUNTER FOR PPD TEST: Primary | ICD-10-CM

## 2017-12-04 PROCEDURE — 86580 TB INTRADERMAL TEST: CPT | Performed by: NURSE PRACTITIONER

## 2017-12-06 ENCOUNTER — TELEPHONE (OUTPATIENT)
Dept: FAMILY MEDICINE CLINIC | Age: 27
End: 2017-12-06

## 2018-01-16 ENCOUNTER — TELEPHONE (OUTPATIENT)
Dept: FAMILY MEDICINE CLINIC | Age: 28
End: 2018-01-16

## 2018-01-16 NOTE — TELEPHONE ENCOUNTER
Attempted to contact patient on 1/16/2018. Result: left message on the patient's voicemail asking patient to return my call. Pre-Visit planning not completed.

## 2018-01-30 ENCOUNTER — OFFICE VISIT (OUTPATIENT)
Dept: FAMILY MEDICINE CLINIC | Age: 28
End: 2018-01-30

## 2018-01-30 VITALS
WEIGHT: 214 LBS | DIASTOLIC BLOOD PRESSURE: 80 MMHG | OXYGEN SATURATION: 97 % | BODY MASS INDEX: 35.61 KG/M2 | SYSTOLIC BLOOD PRESSURE: 110 MMHG | HEART RATE: 80 BPM

## 2018-01-30 DIAGNOSIS — F33.1 MAJOR DEPRESSIVE DISORDER, RECURRENT, MODERATE (HCC): ICD-10-CM

## 2018-01-30 DIAGNOSIS — E78.2 MIXED HYPERLIPIDEMIA: ICD-10-CM

## 2018-01-30 DIAGNOSIS — Z72.0 TOBACCO ABUSE: ICD-10-CM

## 2018-01-30 DIAGNOSIS — R15.9 ENCOPRESIS WITH CONSTIPATION AND OVERFLOW INCONTINENCE: Primary | ICD-10-CM

## 2018-01-30 DIAGNOSIS — F39 MOOD DISORDER (HCC): ICD-10-CM

## 2018-01-30 DIAGNOSIS — F33.2 MAJOR DEPRESSIVE DISORDER, RECURRENT SEVERE WITHOUT PSYCHOTIC FEATURES (HCC): ICD-10-CM

## 2018-01-30 PROCEDURE — 99214 OFFICE O/P EST MOD 30 MIN: CPT | Performed by: FAMILY MEDICINE

## 2018-01-30 PROCEDURE — G8417 CALC BMI ABV UP PARAM F/U: HCPCS | Performed by: FAMILY MEDICINE

## 2018-01-30 PROCEDURE — G8484 FLU IMMUNIZE NO ADMIN: HCPCS | Performed by: FAMILY MEDICINE

## 2018-01-30 PROCEDURE — G8427 DOCREV CUR MEDS BY ELIG CLIN: HCPCS | Performed by: FAMILY MEDICINE

## 2018-01-30 PROCEDURE — 4004F PT TOBACCO SCREEN RCVD TLK: CPT | Performed by: FAMILY MEDICINE

## 2018-01-30 RX ORDER — MINOCYCLINE HYDROCHLORIDE 100 MG/1
CAPSULE ORAL
Refills: 0 | COMMUNITY
Start: 2017-12-14

## 2018-01-30 NOTE — PROGRESS NOTES
100 mg by mouth nightly Yes Historical Provider, MD   nystatin (MYCOSTATIN) 220419 UNIT/GM cream Apply topically 2 times daily. Yes Denia Easton MD   diazepam (VALIUM) 10 MG tablet Take 10 mg by mouth daily as needed for Anxiety Yes Historical Provider, MD   ARIPiprazole (ABILIFY) 30 MG tablet Take 30 mg by mouth daily. Yes Historical Provider, MD   docusate sodium (COLACE) 100 MG capsule 2 at hs Yes Denia Easton MD     Allergies   Allergen Reactions    Molds & Smuts        OBJECTIVE:  There were no vitals taken for this visit. BP Readings from Last 2 Encounters:   11/30/17 114/82   11/09/17 138/82     Wt Readings from Last 3 Encounters:   11/30/17 210 lb (95.3 kg)   11/09/17 200 lb (90.7 kg)   08/01/17 205 lb 3.2 oz (93.1 kg)       Physical Exam   Constitutional: He appears well-developed and well-nourished. No distress. HENT:   Head: Normocephalic and atraumatic. Right Ear: External ear normal.   Left Ear: External ear normal.   Nose: Nose normal.   Lips symmetric   Eyes: Lids are normal. Pupils are equal, round, and reactive to light. Right eye exhibits no discharge. Left eye exhibits no discharge. No scleral icterus. Neck: No JVD present. No thyromegaly present. Cardiovascular: Normal rate and regular rhythm. Pulmonary/Chest: Effort normal. No respiratory distress. Abdominal: Soft. There is no hepatosplenomegaly. There is no tenderness. Musculoskeletal: He exhibits no edema. Skin: Skin is warm and dry. No rash noted. He is not diaphoretic. No erythema. No pallor. Turgor normal   Psychiatric: He has a normal mood and affect. His behavior is normal. Judgment and thought content normal.   Nursing note and vitals reviewed. ASSESSMENT/PLAN:    Anders Brower was seen today for hyperlipidemia, constipation, anxiety and depression.     Diagnoses and all orders for this visit:    Encopresis with constipation and overflow incontinence    Major depressive disorder, recurrent severe without psychotic features ([facility] [address]    Mixed hyperlipidemia    Mood disorder (HCC)    Major depressive disorder, recurrent, moderate (HCC)    Tobacco abuse      The encopresis is currently controlled with stool softener. He has not had to have MiraLAX recently. He thinks his depression is worse but he denies any intent to harm himself. He is now on minocycline for nightmares which she states helps. He will mentioned this to his psychiatrist when he sees him next month. Lipids checked in November were acceptable and the statin is controlling numbers. Tobacco abuse - Pt will not quit despite understanding of risks of continued tobacco use, including cancer, heart attacks, strokes or death. I have encouraged him to continue to try to quit. He got over the staph infection of his knee. Follow-up in 6 months. Patient should call the office immediately with new or ongoing signs or symptoms or worsening, or proceed to the emergency room. No changes in past medical history, past surgical history, social history, or family history were noted during the patient encounter unless specifically listed above. All updates of past medical history, past surgical history, social history, or family history were reviewed personally by me during the office visit. All problems listed in the assessment are stable unless noted otherwise. Medication profile reviewed personally by me during the office visit. Medication side effects and possible impairments from medications were discussed as applicable. This document was prepared by a combination of typing and transcription through a voice recognition software.          Scribe attestation: Navneet Swartz RN, am scribing for and in the presence of Angeles Ya MD. Electronically signed by Claudell Medford, RN on 1/30/2018 at 9:04 AM      Provider attestation:     I, Dr. Nabeel White, personally performed the services described in this documentation, as scribed by the above signed scribe in my presence, and it is both accurate and complete. I agree with the Chief Complaint, ROS, and Past Histories independently gathered by the clinical support staff and the remaining scribed note accurately describes my personal service to the patient.       1/30/2018    9:33 AM

## 2018-02-17 DIAGNOSIS — E78.00 PURE HYPERCHOLESTEROLEMIA: ICD-10-CM

## 2018-02-19 RX ORDER — ATORVASTATIN CALCIUM 10 MG/1
10 TABLET, FILM COATED ORAL DAILY
Qty: 30 TABLET | Refills: 11 | Status: SHIPPED | OUTPATIENT
Start: 2018-02-19 | End: 2019-03-10 | Stop reason: SDUPTHER

## 2018-07-07 DIAGNOSIS — J30.89 NON-SEASONAL ALLERGIC RHINITIS: ICD-10-CM

## 2018-07-09 RX ORDER — LORATADINE 10 MG/1
10 TABLET ORAL DAILY
Qty: 30 TABLET | Refills: 11 | Status: SHIPPED | OUTPATIENT
Start: 2018-07-09 | End: 2019-08-14 | Stop reason: SDUPTHER

## 2018-07-17 ENCOUNTER — TELEPHONE (OUTPATIENT)
Dept: FAMILY MEDICINE CLINIC | Age: 28
End: 2018-07-17

## 2018-07-31 ENCOUNTER — OFFICE VISIT (OUTPATIENT)
Dept: FAMILY MEDICINE CLINIC | Age: 28
End: 2018-07-31

## 2018-07-31 VITALS
SYSTOLIC BLOOD PRESSURE: 108 MMHG | WEIGHT: 210.4 LBS | OXYGEN SATURATION: 99 % | DIASTOLIC BLOOD PRESSURE: 70 MMHG | BODY MASS INDEX: 35.01 KG/M2 | HEART RATE: 97 BPM

## 2018-07-31 DIAGNOSIS — E78.2 MIXED HYPERLIPIDEMIA: ICD-10-CM

## 2018-07-31 DIAGNOSIS — F39 MOOD DISORDER (HCC): ICD-10-CM

## 2018-07-31 DIAGNOSIS — K59.02 CONSTIPATION DUE TO OUTLET DYSFUNCTION: Primary | ICD-10-CM

## 2018-07-31 DIAGNOSIS — Z72.0 TOBACCO ABUSE: ICD-10-CM

## 2018-07-31 DIAGNOSIS — L30.4 INTERTRIGO: ICD-10-CM

## 2018-07-31 DIAGNOSIS — M25.562 ACUTE PAIN OF LEFT KNEE: ICD-10-CM

## 2018-07-31 DIAGNOSIS — R15.9 ENCOPRESIS WITH CONSTIPATION AND OVERFLOW INCONTINENCE: ICD-10-CM

## 2018-07-31 PROCEDURE — 4004F PT TOBACCO SCREEN RCVD TLK: CPT | Performed by: FAMILY MEDICINE

## 2018-07-31 PROCEDURE — G8427 DOCREV CUR MEDS BY ELIG CLIN: HCPCS | Performed by: FAMILY MEDICINE

## 2018-07-31 PROCEDURE — G8417 CALC BMI ABV UP PARAM F/U: HCPCS | Performed by: FAMILY MEDICINE

## 2018-07-31 PROCEDURE — 99214 OFFICE O/P EST MOD 30 MIN: CPT | Performed by: FAMILY MEDICINE

## 2018-07-31 RX ORDER — NYSTATIN 100000 U/G
CREAM TOPICAL
Qty: 60 G | Refills: 3 | Status: SHIPPED | OUTPATIENT
Start: 2018-07-31 | End: 2021-08-12 | Stop reason: SDUPTHER

## 2018-07-31 NOTE — PROGRESS NOTES
Chief Complaint   Patient presents with    Constipation    Hyperlipidemia    Anxiety    Other     patient has multiple medical complaints   he was seen in ER in March for depression and he states he is feeling a lot better now. He complains of left knee cap hurting when he raises his leg up and down. He does admit to being down on his knees recently doing something, reassured it is probably bursitis, observe for now. He is down to 3-4 cigarettes a day and is trying to quit. HPI:  Shane Ferreira is a 33 y.o. (: 1990) here today for  Hyperlipidemia:  No new myalgias or GI upset on atorvastatin (Lipitor). Medication compliance: compliant all of the time. Patient is not following a low fat, low cholesterol diet. He is  exercising regularly. Lab Results   Component Value Date    CHOL 148 2017    TRIG 115 2017    HDL 55 2017    LDLCALC 70 2017     Lab Results   Component Value Date    ALT 44 (H) 2017    AST 22 2017          Patient's medications, allergies, past medical, surgical, social and family histories were reviewed and updated as appropriate on 2018 at 10:11 AM.    ROS:  Review of Systems    All other systems reviewed and are negative except as noted above on 2018 at 10:11 AM. Additional review of systems may be scanned into the media section of this medical record. Any responses requiring further intervention were pursued. LDL Calculated (mg/dL)   Date Value   2017 70     Past Medical History:   Diagnosis Date    Acid reflux     ADHD     Bipolar affect, depressed (HCC)     Hyperlipidemia     Obesity     OCD (obsessive compulsive disorder)     Schizo affective schizophrenia ([facility] [address]     Seizures ([facility] [address]      Family History   Problem Relation Age of Onset    Adopted:  Yes    Mental Illness Mother     Substance Abuse Mother     Mental Illness Father     Substance Abuse Father      Social History     Social History    Marital the remaining scribed note accurately describes my personal service to the patient.       7/31/2018    10:30 AM

## 2018-10-14 ENCOUNTER — HOSPITAL ENCOUNTER (EMERGENCY)
Age: 28
Discharge: HOME | End: 2018-10-15
Attending: EMERGENCY MEDICINE
Payer: MEDICARE

## 2018-10-14 VITALS
RESPIRATION RATE: 18 BRPM | WEIGHT: 210 LBS | BODY MASS INDEX: 33.75 KG/M2 | DIASTOLIC BLOOD PRESSURE: 81 MMHG | HEART RATE: 105 BPM | OXYGEN SATURATION: 94 % | HEIGHT: 66 IN | SYSTOLIC BLOOD PRESSURE: 121 MMHG | TEMPERATURE: 98 F

## 2018-10-14 DIAGNOSIS — R45.89 SUICIDAL RISK: Primary | ICD-10-CM

## 2018-10-14 PROCEDURE — 99285 EMERGENCY DEPT VISIT HI MDM: CPT

## 2018-10-15 NOTE — ED NOTES
Discharge instructions reviewed with pt and pt denied having any questions. Discharge paperwork signed and pt discharged.         Carlos Adams RN  10/15/18 1823
[]  Access to lethal means   []  Prior suicide attempt   []  Active substance abuse   [x]  Highly impulsive behaviors   []  Not attending to self-care/ADLs    []  Recent significant loss   []  Chronic pain or medical illness   []  Social isolation   []  History of violence   []  Active psychosis   [x]  Cognitive impairment    []  No outpatient services in place   []  Medication noncompliance   []  No collateral information to support safety   []      PROTECTIVE FACTORS:  [x] Age >25 and <55  [] Female gender   [x] Denies depression  [x] Denies suicidal ideation  [x] Does not have lethal plan   [x] Does not have access to guns or weapons  [x] Patient is verbally mariia for safety  [x] No prior suicide attempts  [x] No active substance abuse  [x] Patient has social or family support  [] No active psychosis or cognitive dysfunction  [x] Physically healthy  [x] Has outpatient services in place  [x] Compliant with recommended medications  [x] Collateral information from pt's mom April supports patient safety   [x] Patient is future oriented AEB looking forward to upcoming holidays and his birthday  []        Clinical Summary:    Patient presents to [facility] on a SOB after contacting the Suicide Hotline and reporting SI. Patient was clinically sober at the time of the evaluation. Patient was evaluated and offered supportive counseling. Pt reports he was feeling anxious at home and contacted his friend and the suicide hotline and reported SI. Pt found out the police had been called for a well-fare check, and pt call the suicide hotline back and tried to stop the police. He told them he was no longer suicidal, but the police arrived and brought him to the ED. Pt stated he had been home alone tonight, and he was feeling lonely and anxious. He stated he was not actually having suicidal thoughts, he just wanted someone to talk to. Pt stated he took his night time medicine and felt tired and just wanted to go to sleep.  Pt

## 2019-01-22 ENCOUNTER — TELEPHONE (OUTPATIENT)
Dept: FAMILY MEDICINE CLINIC | Age: 29
End: 2019-01-22

## 2019-02-01 ENCOUNTER — OFFICE VISIT (OUTPATIENT)
Dept: FAMILY MEDICINE CLINIC | Age: 29
End: 2019-02-01
Payer: MEDICARE

## 2019-02-01 VITALS
DIASTOLIC BLOOD PRESSURE: 70 MMHG | WEIGHT: 213 LBS | HEIGHT: 66 IN | SYSTOLIC BLOOD PRESSURE: 100 MMHG | OXYGEN SATURATION: 97 % | HEART RATE: 84 BPM | BODY MASS INDEX: 34.23 KG/M2

## 2019-02-01 DIAGNOSIS — Z13.31 POSITIVE DEPRESSION SCREENING: ICD-10-CM

## 2019-02-01 DIAGNOSIS — R20.9 SKIN SENSATION DISTURBANCE: ICD-10-CM

## 2019-02-01 DIAGNOSIS — Z72.0 TOBACCO ABUSE: ICD-10-CM

## 2019-02-01 DIAGNOSIS — D49.2 ABNORMAL SKIN GROWTH: ICD-10-CM

## 2019-02-01 DIAGNOSIS — F33.2 MAJOR DEPRESSIVE DISORDER, RECURRENT SEVERE WITHOUT PSYCHOTIC FEATURES (HCC): Primary | ICD-10-CM

## 2019-02-01 DIAGNOSIS — F39 MOOD DISORDER (HCC): ICD-10-CM

## 2019-02-01 DIAGNOSIS — F60.9 PERSONALITY DISORDER (HCC): ICD-10-CM

## 2019-02-01 DIAGNOSIS — E78.2 MIXED HYPERLIPIDEMIA: ICD-10-CM

## 2019-02-01 DIAGNOSIS — K59.02 CONSTIPATION DUE TO OUTLET DYSFUNCTION: ICD-10-CM

## 2019-02-01 PROCEDURE — 4004F PT TOBACCO SCREEN RCVD TLK: CPT | Performed by: FAMILY MEDICINE

## 2019-02-01 PROCEDURE — 99214 OFFICE O/P EST MOD 30 MIN: CPT | Performed by: FAMILY MEDICINE

## 2019-02-01 PROCEDURE — G0444 DEPRESSION SCREEN ANNUAL: HCPCS | Performed by: FAMILY MEDICINE

## 2019-02-01 PROCEDURE — G8431 POS CLIN DEPRES SCRN F/U DOC: HCPCS | Performed by: FAMILY MEDICINE

## 2019-02-01 PROCEDURE — G8417 CALC BMI ABV UP PARAM F/U: HCPCS | Performed by: FAMILY MEDICINE

## 2019-02-01 PROCEDURE — G8427 DOCREV CUR MEDS BY ELIG CLIN: HCPCS | Performed by: FAMILY MEDICINE

## 2019-02-01 PROCEDURE — G8484 FLU IMMUNIZE NO ADMIN: HCPCS | Performed by: FAMILY MEDICINE

## 2019-02-01 ASSESSMENT — PATIENT HEALTH QUESTIONNAIRE - PHQ9
SUM OF ALL RESPONSES TO PHQ QUESTIONS 1-9: 10
2. FEELING DOWN, DEPRESSED OR HOPELESS: 3
1. LITTLE INTEREST OR PLEASURE IN DOING THINGS: 0
SUM OF ALL RESPONSES TO PHQ9 QUESTIONS 1 & 2: 3
10. IF YOU CHECKED OFF ANY PROBLEMS, HOW DIFFICULT HAVE THESE PROBLEMS MADE IT FOR YOU TO DO YOUR WORK, TAKE CARE OF THINGS AT HOME, OR GET ALONG WITH OTHER PEOPLE: 3
SUM OF ALL RESPONSES TO PHQ QUESTIONS 1-9: 10
7. TROUBLE CONCENTRATING ON THINGS, SUCH AS READING THE NEWSPAPER OR WATCHING TELEVISION: 0
6. FEELING BAD ABOUT YOURSELF - OR THAT YOU ARE A FAILURE OR HAVE LET YOURSELF OR YOUR FAMILY DOWN: 0
9. THOUGHTS THAT YOU WOULD BE BETTER OFF DEAD, OR OF HURTING YOURSELF: 0
5. POOR APPETITE OR OVEREATING: 3
3. TROUBLE FALLING OR STAYING ASLEEP: 0
4. FEELING TIRED OR HAVING LITTLE ENERGY: 1
8. MOVING OR SPEAKING SO SLOWLY THAT OTHER PEOPLE COULD HAVE NOTICED. OR THE OPPOSITE, BEING SO FIGETY OR RESTLESS THAT YOU HAVE BEEN MOVING AROUND A LOT MORE THAN USUAL: 3

## 2019-03-04 ENCOUNTER — OFFICE VISIT (OUTPATIENT)
Dept: FAMILY MEDICINE CLINIC | Age: 29
End: 2019-03-04
Payer: MEDICARE

## 2019-03-04 VITALS
HEART RATE: 68 BPM | WEIGHT: 216.3 LBS | SYSTOLIC BLOOD PRESSURE: 118 MMHG | OXYGEN SATURATION: 98 % | DIASTOLIC BLOOD PRESSURE: 68 MMHG | BODY MASS INDEX: 34.76 KG/M2 | HEIGHT: 66 IN

## 2019-03-04 DIAGNOSIS — R20.9 SKIN SENSATION DISTURBANCE: ICD-10-CM

## 2019-03-04 DIAGNOSIS — R23.3 HEMORRHAGE OF SKIN LESION: ICD-10-CM

## 2019-03-04 DIAGNOSIS — D18.01 HEMANGIOMA OF SKIN AND SUBCUTANEOUS TISSUE: Primary | ICD-10-CM

## 2019-03-04 PROCEDURE — 11311 SHAVE SKIN LESION 0.6-1.0 CM: CPT | Performed by: FAMILY MEDICINE

## 2019-03-10 DIAGNOSIS — E78.00 PURE HYPERCHOLESTEROLEMIA: ICD-10-CM

## 2019-03-11 RX ORDER — ATORVASTATIN CALCIUM 10 MG/1
10 TABLET, FILM COATED ORAL DAILY
Qty: 30 TABLET | Refills: 11 | Status: SHIPPED | OUTPATIENT
Start: 2019-03-11 | End: 2020-05-11

## 2019-07-26 ENCOUNTER — TELEPHONE (OUTPATIENT)
Dept: FAMILY MEDICINE CLINIC | Age: 29
End: 2019-07-26

## 2019-08-09 ENCOUNTER — OFFICE VISIT (OUTPATIENT)
Dept: FAMILY MEDICINE CLINIC | Age: 29
End: 2019-08-09
Payer: MEDICARE

## 2019-08-09 VITALS
HEIGHT: 66 IN | HEART RATE: 91 BPM | DIASTOLIC BLOOD PRESSURE: 70 MMHG | BODY MASS INDEX: 34.55 KG/M2 | SYSTOLIC BLOOD PRESSURE: 108 MMHG | OXYGEN SATURATION: 96 % | WEIGHT: 215 LBS

## 2019-08-09 DIAGNOSIS — M25.562 ACUTE PAIN OF LEFT KNEE: ICD-10-CM

## 2019-08-09 DIAGNOSIS — F81.9 MENTAL DEVELOPMENTAL DELAY: ICD-10-CM

## 2019-08-09 DIAGNOSIS — R15.9 ENCOPRESIS WITH CONSTIPATION AND OVERFLOW INCONTINENCE: ICD-10-CM

## 2019-08-09 DIAGNOSIS — Z72.0 TOBACCO ABUSE: ICD-10-CM

## 2019-08-09 DIAGNOSIS — E78.2 MIXED HYPERLIPIDEMIA: ICD-10-CM

## 2019-08-09 DIAGNOSIS — K59.02 CONSTIPATION DUE TO OUTLET DYSFUNCTION: ICD-10-CM

## 2019-08-09 DIAGNOSIS — F39 MOOD DISORDER (HCC): Primary | ICD-10-CM

## 2019-08-09 LAB
A/G RATIO: 2.7 (ref 1.1–2.2)
ALBUMIN SERPL-MCNC: 4.9 G/DL (ref 3.4–5)
ALP BLD-CCNC: 57 U/L (ref 40–129)
ALT SERPL-CCNC: 38 U/L (ref 10–40)
ANION GAP SERPL CALCULATED.3IONS-SCNC: 14 MMOL/L (ref 3–16)
AST SERPL-CCNC: 22 U/L (ref 15–37)
BILIRUB SERPL-MCNC: 0.8 MG/DL (ref 0–1)
BUN BLDV-MCNC: 14 MG/DL (ref 7–20)
CALCIUM SERPL-MCNC: 9.5 MG/DL (ref 8.3–10.6)
CHLORIDE BLD-SCNC: 103 MMOL/L (ref 99–110)
CHOLESTEROL, TOTAL: 143 MG/DL (ref 0–199)
CO2: 24 MMOL/L (ref 21–32)
CREAT SERPL-MCNC: 1 MG/DL (ref 0.9–1.3)
GFR AFRICAN AMERICAN: >60
GFR NON-AFRICAN AMERICAN: >60
GLOBULIN: 1.8 G/DL
GLUCOSE BLD-MCNC: 89 MG/DL (ref 70–99)
HDLC SERPL-MCNC: 48 MG/DL (ref 40–60)
LDL CHOLESTEROL CALCULATED: 77 MG/DL
POTASSIUM SERPL-SCNC: 4.2 MMOL/L (ref 3.5–5.1)
SODIUM BLD-SCNC: 141 MMOL/L (ref 136–145)
TOTAL PROTEIN: 6.7 G/DL (ref 6.4–8.2)
TRIGL SERPL-MCNC: 88 MG/DL (ref 0–150)
VLDLC SERPL CALC-MCNC: 18 MG/DL

## 2019-08-09 PROCEDURE — 99214 OFFICE O/P EST MOD 30 MIN: CPT | Performed by: FAMILY MEDICINE

## 2019-08-09 PROCEDURE — 4004F PT TOBACCO SCREEN RCVD TLK: CPT | Performed by: FAMILY MEDICINE

## 2019-08-09 PROCEDURE — G8427 DOCREV CUR MEDS BY ELIG CLIN: HCPCS | Performed by: FAMILY MEDICINE

## 2019-08-09 PROCEDURE — G8417 CALC BMI ABV UP PARAM F/U: HCPCS | Performed by: FAMILY MEDICINE

## 2019-08-09 PROCEDURE — 36415 COLL VENOUS BLD VENIPUNCTURE: CPT | Performed by: FAMILY MEDICINE

## 2019-08-09 RX ORDER — BUPROPION HYDROCHLORIDE 300 MG/1
TABLET ORAL
Refills: 0 | COMMUNITY
Start: 2019-06-07

## 2019-08-09 NOTE — PROGRESS NOTES
schizophrenia ([facility]     Seizures ([facility]         Family History   Adopted: Yes   Problem Relation Age of Onset    Mental Illness Mother     Substance Abuse Mother     Mental Illness Father     Substance Abuse Father      Social History     Socioeconomic History    Marital status: Single     Spouse name: Not on file    Number of children: 0    Years of education: 15    Highest education level: Not on file   Occupational History    Occupation: unemployed   Social Needs    Financial resource strain: Not on file    Food insecurity:     Worry: Not on file     Inability: Not on file   [employer] needs:     Medical: Not on file     Non-medical: Not on file   Tobacco Use    Smoking status: Current Some Day Smoker     Packs/day: 0.10     Years: 4.00     Pack years: 0.40     Types: Cigarettes     Last attempt to quit: 1/1/2016     Years since quitting: 3.6    Smokeless tobacco: Former User     Types: Snuff   Substance and Sexual Activity    Alcohol use: No     Alcohol/week: 0.0 standard drinks    Drug use: No    Sexual activity: Not Currently     Partners: Female   Lifestyle    Physical activity:     Days per week: Not on file     Minutes per session: Not on file    Stress: Not on file   Relationships    Social connections:     Talks on phone: Not on file     Gets together: Not on file     Attends [employer] service: Not on file     Active member of club or organization: Not on file     Attends meetings of clubs or organizations: Not on file     Relationship status: Not on file    Intimate partner violence:     Fear of current or ex partner: Not on file     Emotionally abused: Not on file     Physically abused: Not on file     Forced sexual activity: Not on file   Other Topics Concern    Not on file   Social History Narrative    Not on file       Prior to Visit Medications    Medication Sig Taking?  Authorizing Provider   buPROPion (WELLBUTRIN XL) 300 MG extended release tablet TAKE 1 TABLET BY MOUTH encopresis. Underlying mental developmental delay complicates all the above issues. Up 6 months. Patient should call the office immediately with new or ongoing signs or symptoms or worsening, or proceed to the emergency room. No changes in past medical history, past surgical history, social history, or family history were noted during the patient encounter unless specifically listed above. All updates of past medical history, past surgical history, social history, or family history were reviewed personally by me during the office visit. All problems listed in the assessment are stable unless noted otherwise. Medication profile reviewed personally by me during the visit. Medication side effects and possible impairments from medications were discussed as applicable. This document was prepared by a combination of typing and transcription through a voice recognition software. Scribe attestation: I, Marli Celestin MA, am scribing for and in the presence of Salty Miller MD. Electronically signed by Marli Celestin MA on 8/9/2019 at 8:36 AM      Provider attestation:     I, Dr. Chris Almanza, personally performed the services described in this documentation, as scribed by the above signed scribe in my presence, and it is both accurate and complete. I agree with the ROS and Past Histories independently gathered by the clinical support staff and the remaining scribed note accurately describes my personal service to the patient.       8/9/2019    10:08 AM

## 2019-08-14 DIAGNOSIS — J30.89 NON-SEASONAL ALLERGIC RHINITIS: ICD-10-CM

## 2019-08-14 RX ORDER — LORATADINE 10 MG/1
10 TABLET ORAL DAILY
Qty: 30 TABLET | Refills: 11 | Status: SHIPPED | OUTPATIENT
Start: 2019-08-14 | End: 2019-12-09

## 2019-12-09 ENCOUNTER — HOSPITAL ENCOUNTER (EMERGENCY)
Age: 29
Discharge: HOME | End: 2019-12-09
Attending: EMERGENCY MEDICINE
Payer: MEDICARE

## 2019-12-09 ENCOUNTER — APPOINTMENT (OUTPATIENT)
Dept: GENERAL RADIOLOGY | Age: 29
End: 2019-12-09
Payer: MEDICARE

## 2019-12-09 VITALS
RESPIRATION RATE: 16 BRPM | BODY MASS INDEX: 35.36 KG/M2 | SYSTOLIC BLOOD PRESSURE: 126 MMHG | TEMPERATURE: 97.8 F | WEIGHT: 220 LBS | HEIGHT: 66 IN | HEART RATE: 82 BPM | DIASTOLIC BLOOD PRESSURE: 80 MMHG | OXYGEN SATURATION: 100 %

## 2019-12-09 DIAGNOSIS — R07.9 CHEST PAIN, UNSPECIFIED TYPE: Primary | ICD-10-CM

## 2019-12-09 LAB
A/G RATIO: 1.6 (ref 1.1–2.2)
ALBUMIN SERPL-MCNC: 4.6 G/DL (ref 3.4–5)
ALP BLD-CCNC: 64 U/L (ref 40–129)
ALT SERPL-CCNC: 42 U/L (ref 10–40)
ANION GAP SERPL CALCULATED.3IONS-SCNC: 12 MMOL/L (ref 3–16)
AST SERPL-CCNC: 24 U/L (ref 15–37)
BASOPHILS ABSOLUTE: 0 K/UL (ref 0–0.2)
BASOPHILS RELATIVE PERCENT: 0.3 %
BILIRUB SERPL-MCNC: 0.7 MG/DL (ref 0–1)
BUN BLDV-MCNC: 17 MG/DL (ref 7–20)
CALCIUM SERPL-MCNC: 9.5 MG/DL (ref 8.3–10.6)
CHLORIDE BLD-SCNC: 102 MMOL/L (ref 99–110)
CO2: 28 MMOL/L (ref 21–32)
CREAT SERPL-MCNC: 1 MG/DL (ref 0.9–1.3)
EOSINOPHILS ABSOLUTE: 0 K/UL (ref 0–0.6)
EOSINOPHILS RELATIVE PERCENT: 0 %
GFR AFRICAN AMERICAN: >60
GFR NON-AFRICAN AMERICAN: >60
GLOBULIN: 2.8 G/DL
GLUCOSE BLD-MCNC: 117 MG/DL (ref 70–99)
HCT VFR BLD CALC: 46.9 % (ref 40.5–52.5)
HEMOGLOBIN: 15.9 G/DL (ref 13.5–17.5)
LYMPHOCYTES ABSOLUTE: 1.8 K/UL (ref 1–5.1)
LYMPHOCYTES RELATIVE PERCENT: 29.2 %
MCH RBC QN AUTO: 27.2 PG (ref 26–34)
MCHC RBC AUTO-ENTMCNC: 33.9 G/DL (ref 31–36)
MCV RBC AUTO: 80.3 FL (ref 80–100)
MONOCYTES ABSOLUTE: 0.3 K/UL (ref 0–1.3)
MONOCYTES RELATIVE PERCENT: 5.3 %
NEUTROPHILS ABSOLUTE: 3.9 K/UL (ref 1.7–7.7)
NEUTROPHILS RELATIVE PERCENT: 65.2 %
PDW BLD-RTO: 13.3 % (ref 12.4–15.4)
PLATELET # BLD: 243 K/UL (ref 135–450)
PMV BLD AUTO: 7.7 FL (ref 5–10.5)
POTASSIUM REFLEX MAGNESIUM: 3.7 MMOL/L (ref 3.5–5.1)
RBC # BLD: 5.84 M/UL (ref 4.2–5.9)
SODIUM BLD-SCNC: 142 MMOL/L (ref 136–145)
TOTAL PROTEIN: 7.4 G/DL (ref 6.4–8.2)
TROPONIN: <0.01 NG/ML
WBC # BLD: 6 K/UL (ref 4–11)

## 2019-12-09 PROCEDURE — 99285 EMERGENCY DEPT VISIT HI MDM: CPT

## 2019-12-09 PROCEDURE — 84484 ASSAY OF TROPONIN QUANT: CPT

## 2019-12-09 PROCEDURE — 85025 COMPLETE CBC W/AUTO DIFF WBC: CPT

## 2019-12-09 PROCEDURE — 93005 ELECTROCARDIOGRAM TRACING: CPT | Performed by: PHYSICIAN ASSISTANT

## 2019-12-09 PROCEDURE — 80053 COMPREHEN METABOLIC PANEL: CPT

## 2019-12-09 PROCEDURE — 6370000000 HC RX 637 (ALT 250 FOR IP): Performed by: PHYSICIAN ASSISTANT

## 2019-12-09 PROCEDURE — 71046 X-RAY EXAM CHEST 2 VIEWS: CPT

## 2019-12-09 PROCEDURE — 36415 COLL VENOUS BLD VENIPUNCTURE: CPT

## 2019-12-09 RX ORDER — DIAZEPAM 5 MG/1
5 TABLET ORAL EVERY 6 HOURS PRN
COMMUNITY

## 2019-12-09 RX ORDER — IBUPROFEN 600 MG/1
600 TABLET ORAL ONCE
Status: COMPLETED | OUTPATIENT
Start: 2019-12-09 | End: 2019-12-09

## 2019-12-09 RX ORDER — UBIDECARENONE 200 MG
200 CAPSULE ORAL
COMMUNITY

## 2019-12-09 RX ORDER — KETOROLAC TROMETHAMINE 30 MG/ML
15 INJECTION, SOLUTION INTRAMUSCULAR; INTRAVENOUS ONCE
Status: DISCONTINUED | OUTPATIENT
Start: 2019-12-09 | End: 2019-12-09

## 2019-12-09 RX ADMIN — IBUPROFEN 600 MG: 600 TABLET, FILM COATED ORAL at 18:46

## 2019-12-09 ASSESSMENT — PAIN DESCRIPTION - LOCATION: LOCATION: CHEST

## 2019-12-09 ASSESSMENT — PAIN DESCRIPTION - FREQUENCY: FREQUENCY: INTERMITTENT

## 2019-12-09 ASSESSMENT — PAIN DESCRIPTION - PAIN TYPE: TYPE: ACUTE PAIN

## 2019-12-09 ASSESSMENT — PAIN DESCRIPTION - PROGRESSION: CLINICAL_PROGRESSION: NOT CHANGED

## 2019-12-09 ASSESSMENT — PAIN DESCRIPTION - ONSET: ONSET: ON-GOING

## 2019-12-09 ASSESSMENT — ENCOUNTER SYMPTOMS
COUGH: 0
ABDOMINAL PAIN: 0
SHORTNESS OF BREATH: 1
VOMITING: 0

## 2019-12-09 ASSESSMENT — PAIN DESCRIPTION - DESCRIPTORS: DESCRIPTORS: DISCOMFORT

## 2019-12-09 ASSESSMENT — PAIN SCALES - GENERAL: PAINLEVEL_OUTOF10: 3

## 2019-12-10 LAB
EKG ATRIAL RATE: 73 BPM
EKG DIAGNOSIS: NORMAL
EKG P AXIS: 44 DEGREES
EKG P-R INTERVAL: 160 MS
EKG Q-T INTERVAL: 388 MS
EKG QRS DURATION: 78 MS
EKG QTC CALCULATION (BAZETT): 427 MS
EKG R AXIS: 30 DEGREES
EKG T AXIS: 15 DEGREES
EKG VENTRICULAR RATE: 73 BPM

## 2019-12-10 PROCEDURE — 93010 ELECTROCARDIOGRAM REPORT: CPT | Performed by: INTERNAL MEDICINE

## 2020-05-11 RX ORDER — ATORVASTATIN CALCIUM 10 MG/1
TABLET, FILM COATED ORAL
Qty: 90 TABLET | Refills: 3 | Status: SHIPPED | OUTPATIENT
Start: 2020-05-11 | End: 2021-06-03

## 2020-12-14 ENCOUNTER — HOSPITAL ENCOUNTER (EMERGENCY)
Age: 30
Discharge: HOME | End: 2020-12-14
Payer: MEDICARE

## 2020-12-14 ENCOUNTER — TELEPHONE (OUTPATIENT)
Dept: FAMILY MEDICINE CLINIC | Age: 30
End: 2020-12-14

## 2020-12-14 VITALS
BODY MASS INDEX: 37.49 KG/M2 | OXYGEN SATURATION: 99 % | RESPIRATION RATE: 16 BRPM | HEIGHT: 65 IN | DIASTOLIC BLOOD PRESSURE: 82 MMHG | TEMPERATURE: 97.8 F | SYSTOLIC BLOOD PRESSURE: 116 MMHG | HEART RATE: 80 BPM | WEIGHT: 225 LBS

## 2020-12-14 LAB
BILIRUBIN URINE: NEGATIVE
BLOOD, URINE: NEGATIVE
CLARITY: CLEAR
COLOR: YELLOW
GLUCOSE URINE: NEGATIVE MG/DL
KETONES, URINE: NEGATIVE MG/DL
LEUKOCYTE ESTERASE, URINE: NEGATIVE
MICROSCOPIC EXAMINATION: NORMAL
NITRITE, URINE: NEGATIVE
PH UA: 5.5 (ref 5–8)
PROTEIN UA: NEGATIVE MG/DL
SPECIFIC GRAVITY UA: >=1.03 (ref 1–1.03)
URINE REFLEX TO CULTURE: NORMAL
URINE TYPE: NORMAL
UROBILINOGEN, URINE: 0.2 E.U./DL

## 2020-12-14 PROCEDURE — 6370000000 HC RX 637 (ALT 250 FOR IP): Performed by: PHYSICIAN ASSISTANT

## 2020-12-14 PROCEDURE — 81003 URINALYSIS AUTO W/O SCOPE: CPT

## 2020-12-14 PROCEDURE — 99283 EMERGENCY DEPT VISIT LOW MDM: CPT

## 2020-12-14 RX ORDER — ONDANSETRON 4 MG/1
4 TABLET, ORALLY DISINTEGRATING ORAL ONCE
Status: COMPLETED | OUTPATIENT
Start: 2020-12-14 | End: 2020-12-14

## 2020-12-14 RX ORDER — ONDANSETRON 4 MG/1
4 TABLET, FILM COATED ORAL EVERY 8 HOURS PRN
Qty: 10 TABLET | Refills: 0 | Status: SHIPPED | OUTPATIENT
Start: 2020-12-14

## 2020-12-14 RX ORDER — DIAPER,BRIEF,INFANT-TODD,DISP
EACH MISCELLANEOUS 2 TIMES DAILY
Qty: 14 G | Refills: 0 | Status: SHIPPED | OUTPATIENT
Start: 2020-12-14 | End: 2020-12-19

## 2020-12-14 RX ADMIN — ONDANSETRON 4 MG: 4 TABLET, ORALLY DISINTEGRATING ORAL at 18:26

## 2020-12-14 NOTE — TELEPHONE ENCOUNTER
Pt called stating that he comes out of quarantine tomorrow, but he's been sleeping a lot and his face is itching. Pt is able to do a VV.  Call back with recommendations [phone]

## 2020-12-14 NOTE — ED NOTES
Bed: 16  Expected date:   Expected time:   Means of arrival:   Comments:  homar Yu RN  12/14/20 1453

## 2020-12-14 NOTE — ED PROVIDER NOTES
[address] ED  EMERGENCY DEPARTMENT ENCOUNTER        Pt Name: Gi Goldberg  MRN: [id number]  [place] 1990  Date of evaluation: 12/14/2020  Provider: Efra Salgado PA-C  PCP: Kaiden White MD    Shared Visit or Autonomous Visit: AGNES. I have evaluated this patient. My supervising physician was available for consultation. CHIEF COMPLAINT       Chief Complaint   Patient presents with    Positive For Covid-19     diagnosed with COVID-19 x14 days ago.  Nausea     c/o nausea and facial itching        HISTORY OF PRESENT ILLNESS   (Location/Symptom, Timing/Onset, Context/Setting, Quality, Duration, Modifying Factors, Severity)  Note limiting factors. Gi Goldberg is a 35 y.o. male presenting to the emergency department today for evaluation of nausea and vomited x2 this afternoon about 3 PM.  States felt better afterwards. Still has some nausea. No diarrhea or constipation. Normal bowel movement this morning. Denies any abdominal pain. No chest pain or shortness of breath. No cough. No fever. He tested positive for COVID-19 2 weeks ago and will be out of quarantine tomorrow. States initially he did have a cough but this has now resolved. Also complains of dry skin to his face that is itching for the past 1 week. History of eczema states thinks it may have come back on his face. Denies any new products to his face, no new foods or medications. No swelling. No rashes anywhere else. The history is provided by the patient. Nausea & Vomiting  Number of daily episodes:  2x  Able to tolerate:  Liquids  Progression:  Improving  Chronicity:  New  Associated symptoms: no abdominal pain, no cough, no diarrhea and no fever          Nursing Notes were reviewed    REVIEW OF SYSTEMS    (2-9 systems for level 4, 10 or more for level 5)     Review of Systems   Constitutional: Negative for fever. Respiratory: Negative for cough and shortness of breath. Cardiovascular: Negative for chest pain. Gastrointestinal: Positive for nausea and vomiting. Negative for abdominal pain and diarrhea. Genitourinary: Negative for difficulty urinating and dysuria. Skin: Positive for rash. All other systems reviewed and are negative. Positives and Pertinent negatives as per HPI. PAST MEDICAL HISTORY     Past Medical History:   Diagnosis Date    Acid reflux     ADHD     Bipolar affect, depressed ([facility] [facility]     Hyperlipidemia     Obesity     OCD (obsessive compulsive disorder)     Schizo affective schizophrenia ([facility] [facility]     Seizures ([facility] [address]          SURGICAL HISTORY     Past Surgical History:   Procedure Laterality Date    TOOTH EXTRACTION      UPPER GASTROINTESTINAL ENDOSCOPY           CURRENTMEDICATIONS       Discharge Medication List as of 12/14/2020  7:44 PM      CONTINUE these medications which have NOT CHANGED    Details   atorvastatin (LIPITOR) 10 MG tablet TAKE 1 TABLET BY MOUTH EVERY DAY, Disp-90 tablet, R-3Normal      diazepam (VALIUM) 5 MG tablet Take 5 mg by mouth every 6 hours as needed for Anxiety. Historical Med      Co-Enzyme Q10 200 MG CAPS Take 200 mg by mouthHistorical Med      buPROPion (WELLBUTRIN XL) 300 MG extended release tablet TAKE 1 TABLET BY MOUTH IN THE MORNING, R-0Historical Med      nystatin (MYCOSTATIN) 493834 UNIT/GM cream Apply topically 2 times daily. , Disp-60 g, R-3, Normal      minocycline (MINOCIN;DYNACIN) 100 MG capsule TAKE 1 CAPSULE EVERY DAY, R-0Historical Med      ibuprofen (ADVIL;MOTRIN) 400 MG tablet Take 1 tablet by mouth every 6 hours as needed for Pain, Disp-120 tablet, R-3Normal      !! ARIPiprazole (ABILIFY) 15 MG tablet Take 15 mg by mouth daily Take PRN in addition to the 30 mg tabletHistorical Med      FLUoxetine (PROZAC) 20 MG capsule Take 20 mg by mouth daily, R-2Historical Med      amphetamine-dextroamphetamine (ADDERALL, 30MG,) 30 MG tablet Take 30 mg by mouth daily  Ordered by Dr. Evangelist Sullivan  In 12/2016 . traZODone (DESYREL) 100 MG tablet Take 100 mg by mouth nightly      !! ARIPiprazole (ABILIFY) 30 MG tablet Take 30 mg by mouth daily. docusate sodium (COLACE) 100 MG capsule 2 at hs, Disp-60 capsule, R-11       !! - Potential duplicate medications found. Please discuss with provider.             ALLERGIES     Molds & smuts    FAMILYHISTORY       Family History   Adopted: Yes   Problem Relation Age of Onset    Mental Illness Mother     Substance Abuse Mother     Mental Illness Father     Substance Abuse Father           SOCIAL HISTORY       Social History     Socioeconomic History    Marital status: Single     Spouse name: Not on file    Number of children: 0    Years of education: 15    Highest education level: Not on file   Occupational History    Occupation: unemployed   Social Needs    Financial resource strain: Not on file    Food insecurity     Worry: Not on file     Inability: Not on file   [employer] needs     Medical: Not on file     Non-medical: Not on file   Tobacco Use    Smoking status: Current Some Day Smoker     Packs/day: 0.10     Years: 4.00     Pack years: 0.40     Types: Cigarettes     Last attempt to quit: 2016     Years since quittin.9    Smokeless tobacco: Former User     Types: Snuff   Substance and Sexual Activity    Alcohol use: No     Alcohol/week: 0.0 standard drinks    Drug use: No    Sexual activity: Not Currently     Partners: Female   Lifestyle    Physical activity     Days per week: Not on file     Minutes per session: Not on file    Stress: Not on file   Relationships    Social connections     Talks on phone: Not on file     Gets together: Not on file     Attends Yazidism service: Not on file     Active member of club or organization: Not on file     Attends meetings of clubs or organizations: Not on file     Relationship status: Not on file    Intimate partner violence     Fear of current or ex partner: Not on file     Emotionally abused: Not on file     Physically abused: Not on file     Forced sexual activity: Not on file   Other Topics Concern    Not on file   Social History Narrative    Not on file       SCREENINGS             PHYSICAL EXAM    (up to 7 for level 4, 8 or more for level 5)     ED Triage Vitals [12/14/20 1719]   BP Temp Temp Source Pulse Resp SpO2 Height Weight   130/88 97.8 °F (36.6 °C) Oral 80 16 99 % 5' 5\" (1.651 m) 225 lb (102.1 kg)       Physical Exam  Vitals signs and nursing note reviewed. Constitutional:       Appearance: He is well-developed. He is not toxic-appearing. HENT:      Head: Normocephalic and atraumatic. Right Ear: Tympanic membrane and ear canal normal. Tympanic membrane is not erythematous or bulging. Left Ear: Tympanic membrane and ear canal normal. Tympanic membrane is not erythematous or bulging. Mouth/Throat:      Mouth: Mucous membranes are moist.      Pharynx: Oropharynx is clear. No pharyngeal swelling or posterior oropharyngeal erythema. Eyes:      Conjunctiva/sclera: Conjunctivae normal.      Pupils: Pupils are equal, round, and reactive to light. Neck:      Musculoskeletal: Normal range of motion and neck supple. Cardiovascular:      Rate and Rhythm: Normal rate and regular rhythm. Heart sounds: Normal heart sounds. Pulmonary:      Effort: Pulmonary effort is normal. No respiratory distress. Breath sounds: Normal breath sounds. No stridor. No wheezing, rhonchi or rales. Abdominal:      General: Bowel sounds are normal.      Palpations: Abdomen is soft. Abdomen is not rigid. Tenderness: There is no abdominal tenderness. There is no right CVA tenderness, left CVA tenderness, guarding or rebound. Musculoskeletal: Normal range of motion. Skin:     General: Skin is warm and dry. Comments: Erythematous dry flaky skin to forehead and mild at bilateral cheeks. Neurological:      Mental Status: He is alert and oriented to person, place, and time.       GCS: GCS eye subscore is 4. GCS verbal subscore is 5. GCS motor subscore is 6. Cranial Nerves: No cranial nerve deficit. Sensory: No sensory deficit. Motor: No abnormal muscle tone. Coordination: Coordination normal.   Psychiatric:         Speech: Speech normal.         Behavior: Behavior normal.         Thought Content: Thought content normal.         DIAGNOSTIC RESULTS   LABS:    Labs Reviewed   URINE RT REFLEX TO CULTURE    Narrative:     Performed at:  [place] [address],  [place], [facility]   Phone [phone]     Results for orders placed or performed during the hospital encounter of 12/14/20   Urinalysis Reflex to Culture    Specimen: Urine, clean catch   Result Value Ref Range    Color, UA Yellow Straw/Yellow    Clarity, UA Clear Clear    Glucose, Ur Negative Negative mg/dL    Bilirubin Urine Negative Negative    Ketones, Urine Negative Negative mg/dL    Specific Gravity, UA >=1.030 1.005 - 1.030    Blood, Urine Negative Negative    pH, UA 5.5 5.0 - 8.0    Protein, UA Negative Negative mg/dL    Urobilinogen, Urine 0.2 <2.0 E.U./dL    Nitrite, Urine Negative Negative    Leukocyte Esterase, Urine Negative Negative    Microscopic Examination Not Indicated     Urine Type NotGiven     Urine Reflex to Culture Not Indicated        All other labs were within normal range or not returned as of this dictation. EKG: All EKG's are interpreted by the Emergency Department Physician in the absence of a cardiologist.  Please see their note for interpretation of EKG. RADIOLOGY:   Non-plain film images such as CT, Ultrasound and MRI are read by the radiologist. Plain radiographic images are visualized andpreliminarily interpreted by the  ED Provider with the below findings:        Interpretation perthe Radiologist below, if available at the time of this note:    No orders to display     No results found.         PROCEDURES   Unless otherwise noted below, none     Procedures    CRITICAL CARE TIME   N/A    CONSULTS:  None      EMERGENCY DEPARTMENT COURSE and DIFFERENTIAL DIAGNOSIS/MDM:   Vitals:    Vitals:    12/14/20 1719 12/14/20 1906 12/14/20 1944   BP: 130/88 (!) 138/96 116/82   Pulse: 80 78 80   Resp: 16 16 16   Temp: 97.8 °F (36.6 °C)     TempSrc: Oral     SpO2: 99% 98% 99%   Weight: 225 lb (102.1 kg)     Height: 5' 5\" (1.651 m)         Patient was given thefollowing medications:  Medications   ondansetron (ZOFRAN-ODT) disintegrating tablet 4 mg (4 mg Oral Given 12/14/20 1826)       6:19 PM EST  I offered to check labs CBC, CMP and lipase and urinalysis. Patient states he does not like needles would like to hold off on blood work. Agrees to urinalysis. He will be given a p.o. Zofran with po fluid challenge here. 7:41 PM EST  Patient reevaluated. Feeling better. He has been eating and drinking without difficulty. No further vomiting. Denies any abdominal pain. He will be discharged home advised close follow-up with his doctor. Prescription for Zofran. Advise returning to the emergency department for any worsening or changes symptoms. Nonspecific erythematous dry rash of his face we will treat with mild steroid cream for the next few days. He understands return for worsening. Patient discharged in good condition. I estimate there is LOW risk for ACUTE APPENDICITIS, BOWEL OBSTRUCTION, CHOLECYSTITIS, DIVERTICULITIS, INCARCERATED HERNIA, PANCREATITIS, PERFORATED BOWEL, BOWEL ISCHEMIA, GONADAL TORSION thus I consider the discharge disposition reasonable. Also, there is no evidence or peritonitis, sepsis, or toxicity. FINAL IMPRESSION      1. Non-intractable vomiting with nausea, unspecified vomiting type    2. Rash of face          DISPOSITION/PLAN   DISPOSITION Decision to Discharge    PATIENT REFERREDTO:  Minerva Plaza MD  [address] [place] [address].  [place] [address] [phone]    In 2 days      [facility] ED  [address]  [phone]    If symptoms worsen      DISCHARGE MEDICATIONS:  Discharge Medication List as of 12/14/2020  7:44 PM      START taking these medications    Details   ondansetron (ZOFRAN) 4 MG tablet Take 1 tablet by mouth every 8 hours as needed for Nausea, Disp-10 tablet, R-0Print      hydrocortisone 1 % cream Apply topically 2 times daily for 5 days Apply topically 2 times daily. , Topical, 2 TIMES DAILY Starting Mon 12/14/2020, Until Sat 12/19/2020, For 5 days, Disp-14 g, R-0, Print             DISCONTINUED MEDICATIONS:  Discharge Medication List as of 12/14/2020  7:44 PM                 (Please note that portions ofthis note were completed with a voice recognition program.  Efforts were made to edit the dictations but occasionally words are mis-transcribed.)    Apolinar Tejada PA-C (electronically signed)    '        Olga Wakefield PA-C  12/15/20 4014

## 2020-12-14 NOTE — TELEPHONE ENCOUNTER
Called pt, was going to set up a VV - pt is feeling worse, very nauseated and generally worse than before - he's going to [facility] ED for an evaluation right now

## 2020-12-15 ASSESSMENT — ENCOUNTER SYMPTOMS
NAUSEA: 1
ABDOMINAL PAIN: 0
SHORTNESS OF BREATH: 0
DIARRHEA: 0
VOMITING: 1
COUGH: 0

## 2021-03-12 ENCOUNTER — OFFICE VISIT (OUTPATIENT)
Dept: FAMILY MEDICINE CLINIC | Age: 31
End: 2021-03-12
Payer: MEDICARE

## 2021-03-12 VITALS
BODY MASS INDEX: 37.65 KG/M2 | DIASTOLIC BLOOD PRESSURE: 84 MMHG | OXYGEN SATURATION: 95 % | WEIGHT: 226 LBS | HEIGHT: 65 IN | TEMPERATURE: 97.7 F | HEART RATE: 91 BPM | SYSTOLIC BLOOD PRESSURE: 124 MMHG

## 2021-03-12 DIAGNOSIS — E78.2 MIXED HYPERLIPIDEMIA: ICD-10-CM

## 2021-03-12 DIAGNOSIS — F33.2 MAJOR DEPRESSIVE DISORDER, RECURRENT SEVERE WITHOUT PSYCHOTIC FEATURES (HCC): ICD-10-CM

## 2021-03-12 DIAGNOSIS — R45.4 ANGER: ICD-10-CM

## 2021-03-12 DIAGNOSIS — F39 MOOD DISORDER (HCC): ICD-10-CM

## 2021-03-12 DIAGNOSIS — F81.9 MENTAL DEVELOPMENTAL DELAY: ICD-10-CM

## 2021-03-12 DIAGNOSIS — Z72.0 TOBACCO ABUSE: ICD-10-CM

## 2021-03-12 DIAGNOSIS — R73.9 HYPERGLYCEMIA: ICD-10-CM

## 2021-03-12 DIAGNOSIS — Z00.00 ANNUAL PHYSICAL EXAM: Primary | ICD-10-CM

## 2021-03-12 PROCEDURE — G8484 FLU IMMUNIZE NO ADMIN: HCPCS | Performed by: FAMILY MEDICINE

## 2021-03-12 PROCEDURE — G0438 PPPS, INITIAL VISIT: HCPCS | Performed by: FAMILY MEDICINE

## 2021-03-12 NOTE — PROGRESS NOTES
History and Physical      Enedina Shepherd  YOB: 1990    Date of Service:  3/12/2021    Chief Complaint:  Enedina Shepherd is a 35 y.o. male who presents for complete physical examination. HPI:  Wants to discuss his depression and anger issues. He does see a psychiatrist.     [] Patient has completed an advance directive  [x] Patient has NOT completed an advanced directive  [] Patient has a documented healthcare surrogate  [x] Patient does NOT have a documented healthcare surrogate  [] Discussed the importance of establishing and updating an advanced directive. Patient has questions at this time and those were answered. [] Discussed the importance of establishing and updating an advanced directive. Patient does NOT have questions at this time.     Discussed with: [x] Patient            [] Family             [] Other caregiver    Wt Readings from Last 3 Encounters:   03/12/21 226 lb (102.5 kg)   12/14/20 225 lb (102.1 kg)   12/09/19 220 lb (99.8 kg)     BP Readings from Last 3 Encounters:   03/12/21 124/84   12/14/20 116/82   12/09/19 126/80     No results found for: LABA1C    Patient Active Problem List   Diagnosis    Suicide Risk    Mood disorder ([address]    Personality disorder ([address]    Tobacco abuse    Intertrigo    Encopresis with constipation and overflow incontinence    Major depressive disorder, recurrent severe without psychotic features ([address]    Mixed hyperlipidemia    Constipation due to outlet dysfunction    Abnormal skin growth    Skin sensation disturbance    Mental developmental delay       Preventive Care:  Health Maintenance   Topic Date Due    Hepatitis C screen  Never done    Varicella vaccine (1 of 2 - 2-dose childhood series) Never done    Pneumococcal 0-64 years Vaccine (1 of 1 - PPSV23) Never done    Hepatitis A vaccine (2 of 2 - 2-dose series) 09/20/2009    Annual Wellness Visit (AWV)  Never done    Lipid screen  08/09/2020    Flu vaccine (1) 02/20/2045 (Originally 9/1/2020)    DTaP/Tdap/Td vaccine (2 - Td) 05/18/2025    HIV screen  Completed    Hepatitis B vaccine  Aged Out    Hib vaccine  Aged Out    Meningococcal (ACWY) vaccine  Aged Out      Self-testicular exams: No  Sexual activity: none   Last eye exam: unknown, abnormal - wears glasses  Exercise: walks 1 time(s) per day  Seatbelt use: yes  Lipid panel:    Lab Results   Component Value Date    CHOL 143 08/09/2019    TRIG 88 08/09/2019    HDL 48 08/09/2019    LDLCALC 77 08/09/2019       Living will: no,   Advance Care Planning addressed with patient today    Immunization History   Administered Date(s) Administered    PPD Test 12/04/2017    Tdap (Boostrix, Adacel) 05/18/2015       Allergies   Allergen Reactions    Molds & Smuts      Outpatient Medications Marked as Taking for the 3/12/21 encounter (Office Visit) with Sherly Stokes MD   Medication Sig Dispense Refill    ondansetron (ZOFRAN) 4 MG tablet Take 1 tablet by mouth every 8 hours as needed for Nausea 10 tablet 0    atorvastatin (LIPITOR) 10 MG tablet TAKE 1 TABLET BY MOUTH EVERY DAY 90 tablet 3    diazepam (VALIUM) 5 MG tablet Take 5 mg by mouth every 6 hours as needed for Anxiety.  Co-Enzyme Q10 200 MG CAPS Take 200 mg by mouth      buPROPion (WELLBUTRIN XL) 300 MG extended release tablet TAKE 1 TABLET BY MOUTH IN THE MORNING  0    nystatin (MYCOSTATIN) 314740 UNIT/GM cream Apply topically 2 times daily. 60 g 3    minocycline (MINOCIN;DYNACIN) 100 MG capsule TAKE 1 CAPSULE EVERY DAY  0    ibuprofen (ADVIL;MOTRIN) 400 MG tablet Take 1 tablet by mouth every 6 hours as needed for Pain 120 tablet 3    ARIPiprazole (ABILIFY) 15 MG tablet Take 15 mg by mouth daily Take PRN in addition to the 30 mg tablet      FLUoxetine (PROZAC) 20 MG capsule Take 20 mg by mouth daily  2    amphetamine-dextroamphetamine (ADDERALL, 30MG,) 30 MG tablet Take 30 mg by mouth daily  Ordered by Dr. Bobbi Busch  In 12/2016 .       traZODone (DESYREL) 100 MG tablet Take 100 mg by mouth nightly      ARIPiprazole (ABILIFY) 30 MG tablet Take 30 mg by mouth daily.       docusate sodium (COLACE) 100 MG capsule 2 at hs 60 capsule 11       Past Medical History:   Diagnosis Date    Acid reflux     ADHD     Bipolar affect, depressed (HCC)     Hyperlipidemia     Obesity     OCD (obsessive compulsive disorder)     Schizo affective schizophrenia ([facility] [address] [facility]     Seizures ([facility] [address] [facility]      Past Surgical History:   Procedure Laterality Date    TOOTH EXTRACTION      UPPER GASTROINTESTINAL ENDOSCOPY       Family History   Adopted: Yes   Problem Relation Age of Onset    Mental Illness Mother     Substance Abuse Mother     Mental Illness Father     Substance Abuse Father      Social History     Socioeconomic History    Marital status: Single     Spouse name: Not on file    Number of children: 0    Years of education: 15    Highest education level: Not on file   Occupational History    Occupation: unemployed   Social Needs    Financial resource strain: Not on file    Food insecurity     Worry: Not on file     Inability: Not on file   [employer] needs     Medical: Not on file     Non-medical: Not on file   Tobacco Use    Smoking status: Current Some Day Smoker     Packs/day: 0.50     Years: 4.00     Pack years: 2.00     Types: Cigarettes     Last attempt to quit: 2016     Years since quittin.1    Smokeless tobacco: Former User    Tobacco comment: smokes about a pack every 2 day   Substance and Sexual Activity    Alcohol use: No     Alcohol/week: 0.0 standard drinks    Drug use: No    Sexual activity: Not Currently     Partners: Female   Lifestyle    Physical activity     Days per week: Not on file     Minutes per session: Not on file    Stress: Not on file   Relationships    Social connections     Talks on phone: Not on file     Gets together: Not on file     Attends Zoroastrian service: Not on file     Active member of club or organization: Not on file     Attends meetings of clubs or organizations: Not on file     Relationship status: Not on file    Intimate partner violence     Fear of current or ex partner: Not on file     Emotionally abused: Not on file     Physically abused: Not on file     Forced sexual activity: Not on file   Other Topics Concern    Not on file   Social History Narrative    Not on file       Review of Systems:  A comprehensive review of systems was negative except for what was noted in the HPI. Constitutional: No fatigue or weight loss. Respiratory: No cough or dyspnea. Cardiovascular: No chest pain or edema. Gastrointestinal: No constipation or diarrhea. Additional review of systems may be scanned into the media section of this medical record. Any responses requiring further intervention were pursued. Physical Exam:   Vitals:    03/12/21 1407   BP: 124/84   Site: Left Upper Arm   Position: Sitting   Cuff Size: Medium Adult   Pulse: 91   Temp: 97.7 °F (36.5 °C)   TempSrc: Temporal   SpO2: 95%   Weight: 226 lb (102.5 kg)   Height: 5' 5\" (1.651 m)     Body mass index is 37.61 kg/m². Constitutional: He is oriented to person, place, and time. He appears well-developed and well-nourished. No distress. HEENT:   Head: Normocephalic and atraumatic. Right Ear: Tympanic membrane, external ear and ear canal normal.   Left Ear: Tympanic membrane, external ear and ear canal normal.   Nose: Nose normal.   Mouth/Throat: Oropharynx is clear and moist and mucous membranes are normal. No oropharyngeal exudate or posterior oropharyngeal erythema. He has no cervical adenopathy. Eyes: Conjunctivae and extraocular motions are normal. Pupils are equal, round, and reactive to light. Neck: Full passive range of motion without pain. Neck supple. No JVD present. Carotid bruit is not present. No mass and no thyromegaly present. Cardiovascular: Normal rate, regular rhythm, normal heart sounds and intact distal pulses. independently gathered by the clinical support staff and the remaining scribed note accurately describes my personal service to the patient.       3/12/2021    2:41 PM

## 2021-04-11 PROBLEM — Z00.00 ANNUAL PHYSICAL EXAM: Status: RESOLVED | Noted: 2021-03-12 | Resolved: 2021-04-11

## 2021-06-16 ENCOUNTER — TELEPHONE (OUTPATIENT)
Dept: FAMILY MEDICINE CLINIC | Age: 31
End: 2021-06-16

## 2021-06-16 DIAGNOSIS — F17.200 SMOKING ADDICTION: Primary | ICD-10-CM

## 2021-06-19 NOTE — TELEPHONE ENCOUNTER
Scribe 1 month Chantix starter pack and 1 month with 5 refills of continuing treatment pack.   Appointment for office in 2 to 3 months to assess how he is doing

## 2021-06-21 RX ORDER — VARENICLINE TARTRATE 1 MG/1
1 TABLET, FILM COATED ORAL 2 TIMES DAILY
Qty: 60 TABLET | Refills: 3 | Status: SHIPPED | OUTPATIENT
Start: 2021-06-21 | End: 2021-07-07 | Stop reason: SDUPTHER

## 2021-06-21 RX ORDER — VARENICLINE TARTRATE
KIT
Qty: 1 BOX | Refills: 5 | Status: SHIPPED | OUTPATIENT
Start: 2021-06-21 | End: 2021-07-07 | Stop reason: DRUGHIGH

## 2021-07-07 DIAGNOSIS — F17.200 SMOKING ADDICTION: ICD-10-CM

## 2021-07-07 RX ORDER — VARENICLINE TARTRATE 1 MG/1
1 TABLET, FILM COATED ORAL 2 TIMES DAILY
Qty: 60 TABLET | Refills: 3 | Status: SHIPPED | OUTPATIENT
Start: 2021-07-07 | End: 2021-08-12 | Stop reason: ALTCHOICE

## 2021-08-11 ENCOUNTER — TELEPHONE (OUTPATIENT)
Dept: FAMILY MEDICINE CLINIC | Age: 31
End: 2021-08-11

## 2021-08-11 NOTE — TELEPHONE ENCOUNTER
Pt called stating that he went to [facility] office in [facility] on Monday and tested negative for [address]. States he thought he was having symptoms with SOB and a runny nose. Pt is still having symptoms.  Pt uses [employer]. Call back pt with recommendations [phone]  Routing to Rahel Flores due to PCP out of office

## 2021-08-12 ENCOUNTER — VIRTUAL VISIT (OUTPATIENT)
Dept: FAMILY MEDICINE CLINIC | Age: 31
End: 2021-08-12
Payer: MEDICARE

## 2021-08-12 DIAGNOSIS — R06.02 SHORTNESS OF BREATH: ICD-10-CM

## 2021-08-12 DIAGNOSIS — L30.4 INTERTRIGO: ICD-10-CM

## 2021-08-12 DIAGNOSIS — J06.9 ACUTE URI: Primary | ICD-10-CM

## 2021-08-12 PROCEDURE — G8427 DOCREV CUR MEDS BY ELIG CLIN: HCPCS | Performed by: NURSE PRACTITIONER

## 2021-08-12 PROCEDURE — 99214 OFFICE O/P EST MOD 30 MIN: CPT | Performed by: NURSE PRACTITIONER

## 2021-08-12 RX ORDER — AZITHROMYCIN 250 MG/1
250 TABLET, FILM COATED ORAL SEE ADMIN INSTRUCTIONS
Qty: 6 TABLET | Refills: 0 | Status: SHIPPED | OUTPATIENT
Start: 2021-08-12 | End: 2021-08-17

## 2021-08-12 RX ORDER — ALBUTEROL SULFATE 90 UG/1
2 AEROSOL, METERED RESPIRATORY (INHALATION) EVERY 6 HOURS PRN
Qty: 1 INHALER | Refills: 5 | Status: SHIPPED | OUTPATIENT
Start: 2021-08-12

## 2021-08-12 RX ORDER — NYSTATIN 100000 U/G
CREAM TOPICAL
Qty: 60 G | Refills: 3 | Status: SHIPPED | OUTPATIENT
Start: 2021-08-12

## 2021-08-12 ASSESSMENT — ENCOUNTER SYMPTOMS
CHOKING: 0
VOMITING: 0
TROUBLE SWALLOWING: 0
CHEST TIGHTNESS: 1
SINUS PAIN: 0
GASTROINTESTINAL NEGATIVE: 1
SINUS PRESSURE: 0
WHEEZING: 1
NAUSEA: 0
BACK PAIN: 0
FACIAL SWELLING: 0
VOICE CHANGE: 0
COUGH: 0
APNEA: 0
SORE THROAT: 0
ABDOMINAL PAIN: 0
SHORTNESS OF BREATH: 1
DIARRHEA: 0
RHINORRHEA: 1
SWOLLEN GLANDS: 0
STRIDOR: 0

## 2021-08-12 NOTE — PROGRESS NOTES
rash.   Allergic/Immunologic: Negative for environmental allergies, food allergies and immunocompromised state. Neurological: Negative for dizziness, tremors, seizures, syncope, facial asymmetry, speech difficulty, weakness, light-headedness, numbness and headaches. Prior to Visit Medications    Medication Sig Taking? Authorizing Provider   nystatin (MYCOSTATIN) 410018 UNIT/GM cream Apply topically 2 times daily. Yes DONAVON Harris CNP   azithromycin (ZITHROMAX) 250 MG tablet Take 1 tablet by mouth See Admin Instructions for 5 days 500mg on day 1 followed by 250mg on days 2 - 5 Yes DONAVON Harris CNP   albuterol sulfate HFA (VENTOLIN HFA) 108 (90 Base) MCG/ACT inhaler Inhale 2 puffs into the lungs every 6 hours as needed for Wheezing or Shortness of Breath Yes DONAVON Harris CNP   atorvastatin (LIPITOR) 10 MG tablet TAKE 1 TABLET BY MOUTH EVERY DAY Yes Darryle Slain, MD   ondansetron (ZOFRAN) 4 MG tablet Take 1 tablet by mouth every 8 hours as needed for Nausea Yes Anuja Smiley PA-C   diazepam (VALIUM) 5 MG tablet Take 5 mg by mouth every 6 hours as needed for Anxiety.  Yes Historical Provider, MD   Co-Enzyme Q10 200 MG CAPS Take 200 mg by mouth Yes Historical Provider, MD   buPROPion (WELLBUTRIN XL) 300 MG extended release tablet TAKE 1 TABLET BY MOUTH IN THE MORNING Yes Historical Provider, MD   minocycline (MINOCIN;DYNACIN) 100 MG capsule TAKE 1 CAPSULE EVERY DAY Yes Historical Provider, MD   ibuprofen (ADVIL;MOTRIN) 400 MG tablet Take 1 tablet by mouth every 6 hours as needed for Pain Yes DONAVON Lopez CNP   ARIPiprazole (ABILIFY) 15 MG tablet Take 15 mg by mouth daily Take PRN in addition to the 30 mg tablet Yes Historical Provider, MD   FLUoxetine (PROZAC) 20 MG capsule Take 20 mg by mouth daily Yes Historical Provider, MD   amphetamine-dextroamphetamine (ADDERALL, 30MG,) 30 MG tablet Take 30 mg by mouth daily  Ordered by  Miguelangel Villa  In 2016 . Yes Historical Provider, MD   traZODone (DESYREL) 100 MG tablet Take 100 mg by mouth nightly Yes Historical Provider, MD   ARIPiprazole (ABILIFY) 30 MG tablet Take 30 mg by mouth daily. Yes Historical Provider, MD   docusate sodium (COLACE) 100 MG capsule 2 at hs Yes Minerva Plaza MD       Social History     Tobacco Use    Smoking status: Current Some Day Smoker     Packs/day: 0.50     Years: 4.00     Pack years: 2.00     Types: Cigarettes     Last attempt to quit: 2016     Years since quittin.6    Smokeless tobacco: Former User    Tobacco comment: smokes about a pack every 2 day   Vaping Use    Vaping Use: Some days    Substances: Never   Substance Use Topics    Alcohol use: No     Alcohol/week: 0.0 standard drinks    Drug use: No            PHYSICAL EXAMINATION:  [ INSTRUCTIONS:  \"[x]\" Indicates a positive item  \"[]\" Indicates a negative item      Vital Signs: (As obtained by patient/caregiver or practitioner observation)    Blood pressure-  Heart rate-    Respiratory rate-    Temperature-  Pulse oximetry-     Constitutional: [x] Appears well-developed and well-nourished [x] No apparent distress      [] Abnormal-   Mental status  [x] Alert and awake  [x] Oriented to person/place/time [x]Able to follow commands      Eyes:  EOM    [x]  Normal  [] Abnormal-  Sclera  [x]  Normal  [] Abnormal -         Discharge [x]  None visible  [] Abnormal -    HENT:   [x] Normocephalic, atraumatic.   [] Abnormal   [x] Mouth/Throat: Mucous membranes are moist.     External Ears [x] Normal  [] Abnormal-     Neck: [x] No visualized mass     Pulmonary/Chest: [x] Respiratory effort normal.  [x] No visualized signs of difficulty breathing or respiratory distress        [] Abnormal-      Musculoskeletal:   [x] Normal gait with no signs of ataxia         [x] Normal range of motion of neck        [] Abnormal-       Neurological:        [x] No Facial Asymmetry (Cranial nerve 7 motor function) (limited exam to video visit)          [x] No gaze palsy        [] Abnormal-         Skin:        [x] No significant exanthematous lesions or discoloration noted on facial skin         [] Abnormal-            Psychiatric:       [x] Normal Affect [x] No Hallucinations        [] Abnormal-     Other pertinent observable physical exam findings- Able to talk in complete sentences without distress    ASSESSMENT/PLAN:  Osvaldo Hadley was seen today for other. Diagnoses and all orders for this visit:    Acute URI  -     azithromycin (ZITHROMAX) 250 MG tablet; Take 1 tablet by mouth See Admin Instructions for 5 days 500mg on day 1 followed by 250mg on days 2 - 5  -     albuterol sulfate HFA (VENTOLIN HFA) 108 (90 Base) MCG/ACT inhaler; Inhale 2 puffs into the lungs every 6 hours as needed for Wheezing or Shortness of Breath    Shortness of breath  -     XR CHEST STANDARD (2 VW); Future  -     azithromycin (ZITHROMAX) 250 MG tablet; Take 1 tablet by mouth See Admin Instructions for 5 days 500mg on day 1 followed by 250mg on days 2 - 5  -     albuterol sulfate HFA (VENTOLIN HFA) 108 (90 Base) MCG/ACT inhaler; Inhale 2 puffs into the lungs every 6 hours as needed for Wheezing or Shortness of Breath    Intertrigo  -     nystatin (MYCOSTATIN) 032794 UNIT/GM cream; Apply topically 2 times daily. Return if symptoms worsen or fail to improve. Sapna Delgado is a 35 y.o. male being evaluated by a Virtual Visit (video visit) encounter to address concerns as mentioned above. A caregiver was present when appropriate. Due to this being a TeleHealth encounter (During [place] [address] public health emergency), evaluation of the following organ systems was limited: Vitals/Constitutional/EENT/Resp/CV/GI//MS/Neuro/Skin/Heme-Lymph-Imm.   Pursuant to the emergency declaration under the [facility], [address] [place] [address] authority and the [employer] and [facility] Act, this Virtual Visit was conducted with patient's (and/or legal guardian's) consent, to reduce the patient's risk of exposure to COVID-19 and provide necessary medical care. The patient (and/or legal guardian) has also been advised to contact this office for worsening conditions or problems, and seek emergency medical treatment and/or call 911 if deemed necessary. Patient identification was verified at the start of the visit: Yes    Services were provided through a video synchronous discussion virtually to substitute for in-person clinic visit. Patient and provider were located at their individual homes. --DONAVON Duran - CNP on 8/12/2021 at 11:23 AM    An electronic signature was used to authenticate this note. Patient should call the office immediately with new or ongoing signs or symptoms or worsening, or proceed to the emergency room. All entries in chief complaint and history of present illness are reviewed and validated by me. No changes in past medical history, past surgical history, social history, or family history were noted during the patient encounter unless specifically listed above. All updates of past medical history, past surgical history, social history, or family history were reviewed personally by me during the office visit. All problems listed in the assessment are stable unless noted otherwise. Medication profile reviewed personally by me during the office visit. Medication side effects and possible impairments from medications were discussed as applicable. Every effort has been made to assure accurate transcription by this voice recognition software. However, mistakes in transcription may still occur    You are being started on a new medication. All medications have the potential for adverse effects. All medications effect each person differently. Please read and review provided information related to medication.  If the medication that you have been prescribed has the potential to cause sedation, do not drive or operate car, truck, or heavy machinery until you know how the medication will effect you. If you experience any adverse effects from the medication, please call the office or report to the emergency department.

## 2021-08-13 ENCOUNTER — HOSPITAL ENCOUNTER (OUTPATIENT)
Age: 31
Discharge: HOME | End: 2021-08-13
Payer: MEDICARE

## 2021-08-13 ENCOUNTER — PATIENT MESSAGE (OUTPATIENT)
Dept: FAMILY MEDICINE CLINIC | Age: 31
End: 2021-08-13

## 2021-08-13 ENCOUNTER — HOSPITAL ENCOUNTER (OUTPATIENT)
Dept: GENERAL RADIOLOGY | Age: 31
Discharge: HOME | End: 2021-08-13
Payer: MEDICARE

## 2021-08-13 DIAGNOSIS — R06.02 SHORTNESS OF BREATH: ICD-10-CM

## 2021-08-13 DIAGNOSIS — R91.1 LUNG NODULE: ICD-10-CM

## 2021-08-13 DIAGNOSIS — R93.89 ABNORMAL CHEST X-RAY: Primary | ICD-10-CM

## 2021-08-13 PROCEDURE — 71046 X-RAY EXAM CHEST 2 VIEWS: CPT

## 2021-08-13 NOTE — TELEPHONE ENCOUNTER
From: Inocencio Simmons  To: Glenda Lancaster MD  Sent: 8/13/2021 3:41 PM EDT  Subject: Test Results Question    Hi Dr Louisa Evans: this is Kai's mother/guardian. We got the results from his chest X-ray. What are we going to do about the Thoracic nodule? If anything.     Kindly advise     Toshia Kelly [facility] [phone]  April (mom & guardian) [phone]

## 2021-08-16 NOTE — TELEPHONE ENCOUNTER
CT ordered and [facility] message sent back informing of Gisell result note and her recommendation for the CT.  Provided number to call and schedule for Ct

## 2021-08-18 ENCOUNTER — PATIENT MESSAGE (OUTPATIENT)
Dept: FAMILY MEDICINE CLINIC | Age: 31
End: 2021-08-18

## 2021-08-18 DIAGNOSIS — R91.1 LUNG NODULE: Primary | ICD-10-CM

## 2021-08-19 ENCOUNTER — TELEPHONE (OUTPATIENT)
Dept: PULMONOLOGY | Age: 31
End: 2021-08-19

## 2021-08-20 ENCOUNTER — HOSPITAL ENCOUNTER (OUTPATIENT)
Dept: CT IMAGING | Age: 31
Discharge: HOME | End: 2021-08-20
Payer: MEDICARE

## 2021-08-20 DIAGNOSIS — R93.89 ABNORMAL CHEST X-RAY: ICD-10-CM

## 2021-08-20 DIAGNOSIS — R91.1 LUNG NODULE: ICD-10-CM

## 2021-08-20 PROCEDURE — 6360000004 HC RX CONTRAST MEDICATION: Performed by: FAMILY MEDICINE

## 2021-08-20 PROCEDURE — 71260 CT THORAX DX C+: CPT

## 2021-08-20 RX ADMIN — IOPAMIDOL 75 ML: 755 INJECTION, SOLUTION INTRAVENOUS at 15:28

## 2021-08-27 ENCOUNTER — VIRTUAL VISIT (OUTPATIENT)
Dept: FAMILY MEDICINE CLINIC | Age: 31
End: 2021-08-27
Payer: MEDICARE

## 2021-08-27 ENCOUNTER — OFFICE VISIT (OUTPATIENT)
Dept: PULMONOLOGY | Age: 31
End: 2021-08-27
Payer: MEDICARE

## 2021-08-27 VITALS
SYSTOLIC BLOOD PRESSURE: 127 MMHG | WEIGHT: 221 LBS | OXYGEN SATURATION: 98 % | DIASTOLIC BLOOD PRESSURE: 84 MMHG | BODY MASS INDEX: 36.78 KG/M2 | HEART RATE: 90 BPM

## 2021-08-27 DIAGNOSIS — Z72.0 TOBACCO ABUSE: ICD-10-CM

## 2021-08-27 DIAGNOSIS — E66.9 CLASS 2 OBESITY WITHOUT SERIOUS COMORBIDITY WITH BODY MASS INDEX (BMI) OF 36.0 TO 36.9 IN ADULT, UNSPECIFIED OBESITY TYPE: ICD-10-CM

## 2021-08-27 DIAGNOSIS — Z20.822 ENCOUNTER FOR PREPROCEDURE SCREENING LABORATORY TESTING FOR COVID-19: ICD-10-CM

## 2021-08-27 DIAGNOSIS — Z01.812 ENCOUNTER FOR PREPROCEDURE SCREENING LABORATORY TESTING FOR COVID-19: ICD-10-CM

## 2021-08-27 DIAGNOSIS — F60.9 PERSONALITY DISORDER (HCC): ICD-10-CM

## 2021-08-27 DIAGNOSIS — F39 MOOD DISORDER (HCC): ICD-10-CM

## 2021-08-27 DIAGNOSIS — R06.02 SHORTNESS OF BREATH: Primary | ICD-10-CM

## 2021-08-27 DIAGNOSIS — G47.10 HYPERSOMNOLENCE: ICD-10-CM

## 2021-08-27 DIAGNOSIS — R91.1 LUNG NODULE: ICD-10-CM

## 2021-08-27 DIAGNOSIS — E27.8 ADRENAL NODULE (HCC): Primary | ICD-10-CM

## 2021-08-27 DIAGNOSIS — R45.4 ANGER: ICD-10-CM

## 2021-08-27 DIAGNOSIS — F33.2 MAJOR DEPRESSIVE DISORDER, RECURRENT SEVERE WITHOUT PSYCHOTIC FEATURES (HCC): ICD-10-CM

## 2021-08-27 DIAGNOSIS — G47.33 OBSTRUCTIVE SLEEP APNEA: ICD-10-CM

## 2021-08-27 PROCEDURE — 1036F TOBACCO NON-USER: CPT | Performed by: FAMILY MEDICINE

## 2021-08-27 PROCEDURE — G8417 CALC BMI ABV UP PARAM F/U: HCPCS | Performed by: FAMILY MEDICINE

## 2021-08-27 PROCEDURE — D1320 PR TOBACCO CNSL CONTROL&PREVENTION ORAL DISEASE: HCPCS | Performed by: INTERNAL MEDICINE

## 2021-08-27 PROCEDURE — 1036F TOBACCO NON-USER: CPT | Performed by: INTERNAL MEDICINE

## 2021-08-27 PROCEDURE — 99204 OFFICE O/P NEW MOD 45 MIN: CPT | Performed by: INTERNAL MEDICINE

## 2021-08-27 PROCEDURE — G8417 CALC BMI ABV UP PARAM F/U: HCPCS | Performed by: INTERNAL MEDICINE

## 2021-08-27 PROCEDURE — G8427 DOCREV CUR MEDS BY ELIG CLIN: HCPCS | Performed by: FAMILY MEDICINE

## 2021-08-27 PROCEDURE — G8427 DOCREV CUR MEDS BY ELIG CLIN: HCPCS | Performed by: INTERNAL MEDICINE

## 2021-08-27 PROCEDURE — 99214 OFFICE O/P EST MOD 30 MIN: CPT | Performed by: FAMILY MEDICINE

## 2021-08-27 SDOH — ECONOMIC STABILITY: FOOD INSECURITY: WITHIN THE PAST 12 MONTHS, YOU WORRIED THAT YOUR FOOD WOULD RUN OUT BEFORE YOU GOT MONEY TO BUY MORE.: NEVER TRUE

## 2021-08-27 SDOH — ECONOMIC STABILITY: FOOD INSECURITY: WITHIN THE PAST 12 MONTHS, THE FOOD YOU BOUGHT JUST DIDN'T LAST AND YOU DIDN'T HAVE MONEY TO GET MORE.: NEVER TRUE

## 2021-08-27 ASSESSMENT — ENCOUNTER SYMPTOMS
ORTHOPNEA: 0
HEMOPTYSIS: 0
SHORTNESS OF BREATH: 1
RHINORRHEA: 0
ALLERGIC/IMMUNOLOGIC NEGATIVE: 1
EYES NEGATIVE: 1
VOMITING: 0
ABDOMINAL PAIN: 0
SPUTUM PRODUCTION: 0
SWOLLEN GLANDS: 0
SORE THROAT: 0
GASTROINTESTINAL NEGATIVE: 1
WHEEZING: 0

## 2021-08-27 ASSESSMENT — SOCIAL DETERMINANTS OF HEALTH (SDOH): HOW HARD IS IT FOR YOU TO PAY FOR THE VERY BASICS LIKE FOOD, HOUSING, MEDICAL CARE, AND HEATING?: NOT HARD AT ALL

## 2021-08-27 NOTE — PROGRESS NOTES
2021    TELEHEALTH EVALUATION -- Audio/Visual (During YDXQO-22 public health emergency)    HPI:    Rut Velasquez (:  1990) has requested an audio/video evaluation for the following concern(s):    Patient is being seen for a follow up on his depression/anxiety and anger issues. He is still seeing his psychiatrist and has no new issues or concerns at this time. He states he quit smoking about 2 weeks ago and never did start taking the Chantix. Internal Administration   First Dose      Second Dose           Last COVID Lab No results found for: SARS-COV-2, SARS-COV-2 RNA, SARS-COV-2, SARS-COV-2, SARS-COV-2 BY PCR, SARS-COV-2, SARS-COV-2, SARS-COV-2         Wt Readings from Last 3 Encounters:   21 221 lb (100.2 kg)   21 226 lb (102.5 kg)   20 225 lb (102.1 kg)     BP Readings from Last 3 Encounters:   21 127/84   21 124/84   20 116/82     No results found for: LABA1C    [] Patient has completed an advance directive  [x] Patient has NOT completed an advanced directive  [] Patient has a documented healthcare surrogate  [x] Patient does NOT have a documented healthcare surrogate  [] Discussed the importance of establishing and updating an advanced directive. Patient has questions at this time and those were answered. [x] Discussed the importance of establishing and updating an advanced directive. Patient does NOT have questions at this time. Discussed with: [x] Patient            [] Family             [] Other caregiver      All other ROS negative    Prior to Visit Medications    Medication Sig Taking? Authorizing Provider   nystatin (MYCOSTATIN) 533960 UNIT/GM cream Apply topically 2 times daily.  Yes DONAVON Veronica NP   albuterol sulfate HFA (VENTOLIN HFA) 108 (90 Base) MCG/ACT inhaler Inhale 2 puffs into the lungs every 6 hours as needed for Wheezing or Shortness of Breath Yes DONAVON Veronica NP   atorvastatin (LIPITOR) 10 MG tablet TAKE 1 TABLET BY MOUTH EVERY DAY Yes Lavonne Olmedo MD   ondansetron (ZOFRAN) 4 MG tablet Take 1 tablet by mouth every 8 hours as needed for Nausea Yes Morgan Em PA-C   diazepam (VALIUM) 5 MG tablet Take 5 mg by mouth every 6 hours as needed for Anxiety. Yes Historical Provider, MD   Co-Enzyme Q10 200 MG CAPS Take 200 mg by mouth Yes Historical Provider, MD   buPROPion (WELLBUTRIN XL) 300 MG extended release tablet TAKE 1 TABLET BY MOUTH IN THE MORNING Yes Historical Provider, MD   minocycline (MINOCIN;DYNACIN) 100 MG capsule TAKE 1 CAPSULE EVERY DAY Yes Historical Provider, MD   ibuprofen (ADVIL;MOTRIN) 400 MG tablet Take 1 tablet by mouth every 6 hours as needed for Pain Yes DONAVON Richards - CNP   ARIPiprazole (ABILIFY) 15 MG tablet Take 15 mg by mouth daily Take PRN in addition to the 30 mg tablet Yes Historical Provider, MD   FLUoxetine (PROZAC) 20 MG capsule Take 20 mg by mouth daily Yes Historical Provider, MD   amphetamine-dextroamphetamine (ADDERALL, 30MG,) 30 MG tablet Take 30 mg by mouth daily  Ordered by Dr. Sandy Winter  In 12/2016 . Yes Historical Provider, MD   traZODone (DESYREL) 100 MG tablet Take 100 mg by mouth nightly Yes Historical Provider, MD   ARIPiprazole (ABILIFY) 30 MG tablet Take 30 mg by mouth daily.  Yes Historical Provider, MD   docusate sodium (COLACE) 100 MG capsule 2 at hs Yes Lavonne Olmedo MD       Allergies   Allergen Reactions    Molds & Smuts        Past Medical History:   Diagnosis Date    Acid reflux     ADHD     [facility]     Hyperlipidemia     Obesity     OCD (obsessive compulsive disorder)     Schizo affective schizophrenia [facility] [address] [facility]     Seizures ([facility] [address]        Past Surgical History:   Procedure Laterality Date    TOOTH EXTRACTION      UPPER GASTROINTESTINAL ENDOSCOPY         Social History     Tobacco Use    Smoking status: Former Smoker     Packs/day: 0.50     Years: 4.00     Pack years: 2.00 Types: Cigarettes     Start date:      Quit date: 2021     Years since quittin.1    Smokeless tobacco: Former User    Tobacco comment: smokes about a pack every 2 day   Vaping Use    Vaping Use: Some days    Substances: Never   Substance Use Topics    Alcohol use: No     Alcohol/week: 0.0 standard drinks    Drug use: No       PHYSICAL EXAMINATION:  [ INSTRUCTIONS:  \"[x]\" Indicates a positive item  \"[]\" Indicates a negative item  -- DELETE ALL ITEMS NOT EXAMINED]  Vital Signs: (As obtained by patient/caregiver or practitioner observation)  See flowsheet  Blood pressure- Heart rate-    Respiratory rate-    Temperature-  Pulse oximetry-     Constitutional: [x] Appears well-developed and well-nourished [x] No apparent distress      [] Abnormal-   Mental status  [x] Alert and awake  [x] Oriented to person/place/time [x]Able to follow commands      Eyes:  EOM    [x]  Normal  [] Abnormal-  Sclera  [x]  Normal  [] Abnormal -         Discharge [x]  None visible  [] Abnormal -    HENT:   [x] Normocephalic, atraumatic. [] Abnormal   [] Mouth/Throat: Mucous membranes are moist.     External Ears [x] Normal  [] Abnormal-     Neck: [x] No visualized mass     Pulmonary/Chest: [x] Respiratory effort normal.  [x] No visualized signs of difficulty breathing or respiratory distress        [] Abnormal-      Musculoskeletal:   [] Normal gait with no signs of ataxia         [x] Normal range of motion of neck        [] Abnormal-       Neurological:        [x] No Facial Asymmetry (Cranial nerve 7 motor function) (limited exam to video visit)          [x] No gaze palsy        [] Abnormal-         Skin:        [x] No significant exanthematous lesions or discoloration noted on facial skin         [] Abnormal-            Psychiatric:       [x] Normal Affect [x] No Hallucinations        [] Abnormal-     Other pertinent observable physical exam findings-      ASSESSMENT PLAN      Diagnosis Orders   1.  Adrenal nodule ([address] 2. Anger     3. Personality disorder ([facility] [address]     4. Mood disorder ([facility] [address]     5. Major depressive disorder, recurrent severe without psychotic features ([facility] [address]     6. Tobacco abuse     The chest x-ray which initially reported a pulmonary nodule turned out not to be the case on CT imaging. However the adrenal nodule was discovered and recommendation was for follow-up in 1 year. I believe that CAT scan has already been ordered. He states his nerves are doing okay. He continues to smoke. He saw a lung specialist today for the reported nodule and was told by the specialist again there was no nodule that need further work-up but pulmonary specialist believes that he should have breathing test as well as an evaluation for sleep apnea based on large neck circumference. The studies are scheduled. He is encouraged again to quit smoking. He has an appointment for physical already scheduled in March next year call sooner as needed. Patient should call the office immediately with new or ongoing signs or symptoms or worsening, or proceed to the emergency room. No changes in past medical history, past surgical history, social history, or family history were noted during the patient encounter unless specifically listed above. All updates of past medical history, past surgical history, social history, or family history were reviewed personally by me during the office visit. All problems listed in the assessment are stable unless noted otherwise. Medication profile reviewed personally by me during the visit. Medication side effects and possible impairments from medications were discussed as applicable. This document was prepared by a combination of typing and transcription through a voice recognition software. I, Dr. Welton Kayser, personally performed the services described in this documentation, as scribed by the above signed scribe in my presence, and it is both accurate and complete.  I agree with the ROS and Past Histories independently gathered by the clinical support staff and the remaining scribed note accurately describes my personal service to the patient. 8/27/2021    11:44 AM               Ezequiel Fisher is a 35 y.o. male being evaluated by a Virtual Visit (video visit) encounter to address concerns as mentioned above. A caregiver was present when appropriate. Due to this being a TeleHealth encounter (During UVCTM-57 public health emergency), evaluation of the following organ systems was limited: Vitals/Constitutional/EENT/Resp/CV/GI//MS/Neuro/Skin/Heme-Lymph-Imm. Pursuant to the emergency declaration under the [address] [place] [address], [address] [place] [address] authority and the [employer] and [employer] Act, this Virtual Visit was conducted with patient's (and/or legal guardian's) consent, to reduce the patient's risk of exposure to COVID-19 and provide necessary medical care. The patient (and/or legal guardian) has also been advised to contact this office for worsening conditions or problems, and seek emergency medical treatment and/or call 911 if deemed necessary. Services were provided through a video synchronous discussion virtually to substitute for in-person clinic visit. Patient and provider were located at their individual homes. --Judge Sukh MA on 8/27/2021 at 11:27 AM    An electronic signature was used to authenticate this note.

## 2021-08-27 NOTE — PATIENT INSTRUCTIONS
ASSESSMENT/PLAN:  1. Shortness of breath  -     Full PFT Study With Bronchodilator; Future  -     Bronchial Challenge; Future  2. Lung nodule  3. Class 2 obesity without serious comorbidity with body mass index (BMI) of 36.0 to 36.9 in adult, unspecified obesity type  4. Tobacco abuse  -     Full PFT Study With Bronchodilator; Future  -     Bronchial Challenge; Future  5. Obstructive sleep apnea  6. Hypersomnolence      Lung nodule  Chest x-ray done 8/13/2021  Impression   1. No acute airspace consolidation. 2. 4 mm nodular opacity overlying the midthoracic spine on the lateral view,   possibly a pulmonary nodule.  Recommend further characterization with a chest   CT, preferably with contrast.   The findings were sent to the Radiology Results [address] at 12:38   pm on 8/13/2021to be communicated to a licensed caregiver.             CT of the chest on 8/20/2021  Impression   No pulmonary nodule.  Findings on the recent chest x-ray likely due to   superimposition artifact of vasculature simulating a nodule.       No evidence of acute cardiopulmonary process.       1.1 cm indeterminate left adrenal nodule.  This is most likely an incidental   adenoma but cannot be diagnosed as such on this examination.  Recommendation   for this scenario is 1 year follow-up adrenal washout pre/post contrast CT,   to be performed 1 year now, assuming there is no history of cancer.  If there   is history of cancer than a metastatic lesion would be a concern and   whole-body PET-CT would be recommended           No lung nodule as per the Ct scan  However does have adrenal nodule  Agree with ct scan in one year. I  RECOMMENDATIONS:         Advised to avoid driving when too sleepy to function safely and given a discussion of the risks of untreated apnea such as accidents, cognitive impairment, mood impairment, high blood pressure, various cardiac diseases and stroke. Weight loss was encouraged.        ESS is 12/24    Neck size is 17 inches        Would recommend sleep study      Tobacco use  Stop smoking      Shortness of breath  Will get   PFT  Methacholine    Call me or mychart me after the testing      Ok to use  Albuterol  2 puffs as needed three times a day      RTC in 3 months

## 2021-08-27 NOTE — PROGRESS NOTES
Lidia Cole (:  1990) is a 35 y.o. male,New patient, here for evaluation of the following chief complaint(s):  New Patient (referred by Jodee RAPHAEL-NP, for pulmonary nodule)         ASSESSMENT/PLAN:  1. Shortness of breath  -     Full PFT Study With Bronchodilator; Future  -     Bronchial Challenge; Future  2. Lung nodule  3. Class 2 obesity without serious comorbidity with body mass index (BMI) of 36.0 to 36.9 in adult, unspecified obesity type  4. Tobacco abuse  -     Full PFT Study With Bronchodilator; Future  -     Bronchial Challenge; Future  5. Obstructive sleep apnea  6. Hypersomnolence      Lung nodule  Chest x-ray done 2021  Impression   1. No acute airspace consolidation. 2. 4 mm nodular opacity overlying the midthoracic spine on the lateral view,   possibly a pulmonary nodule.  Recommend further characterization with a chest   CT, preferably with contrast.   The findings were sent to the Radiology Results [address] at 12:38   pm on 2021to be communicated to a licensed caregiver.             CT of the chest on 2021  Impression   No pulmonary nodule.  Findings on the recent chest x-ray likely due to   superimposition artifact of vasculature simulating a nodule.       No evidence of acute cardiopulmonary process.       1.1 cm indeterminate left adrenal nodule.  This is most likely an incidental   adenoma but cannot be diagnosed as such on this examination.  Recommendation   for this scenario is 1 year follow-up adrenal washout pre/post contrast CT,   to be performed 1 year now, assuming there is no history of cancer.  If there   is history of cancer than a metastatic lesion would be a concern and   whole-body PET-CT would be recommended           No lung nodule as per the Ct scan  However does have adrenal nodule  Agree with ct scan in one year.          I  RECOMMENDATIONS:         Advised to avoid driving when too sleepy to function safely and given a discussion of the risks of untreated apnea such as accidents, cognitive impairment, mood impairment, high blood pressure, various cardiac diseases and stroke. Weight loss was encouraged. ESS is 12/24    Neck size is 17 inches        Would recommend sleep study      Tobacco use  Stop smoking      Shortness of breath  Will get   PFT  Methacholine    Call me or mychart me after the testing      Ok to use  Albuterol  2 puffs as needed three times a day      RTC in 3 months      No follow-ups on file. Subjective   SUBJECTIVE/OBJECTIVE:  Consult from DONAVON Elam  For lung nodule  Initially seen by me on 8/27/2021          Had cxr done b/c of sob  But smoking 1/2 ppd   For about 3-4 years      Sob for the past 3-4 weeks    With laying down will get sob  And getting up    Lost weight, not affecting      Some sob and cough in the middle of night    + snore  + dry mouth    No headache  No nocturia  Toss and turn  Some sleepiness, more as per mom      Subjective:              35old year old,male, with PMH significant for obesity,  that presents today for initial evaluation. Pt reports snoring for several years  and that it is getting same. Pt reports does snore moderate for years. Pt's  does wake   himself with drymouth, snoring, sweating, sore throat, . Pt does report fatigue or tiredness frequently. Pt sleeps more than 7 hours, and at time overwhelming sleepiness attacks. Pt  does dozes unintentionally while watching TV. While driving  Does not feel drowsy / nod off / fall sleep at stop lights. Pt does not have h/o sleepiness associated wrecks/near wrecks. Pt does  nod off while  unattended. Pt does not report having restless legs 0 times a week. This is often accompanied by leg jerks during sleep, numbness in legs or feet, aches/burning/cramps in legs, feet. Does  report having nasal congestion. Does not for use of nasal sprays, nose or sinus surgery.   Does not for broken nose, tonsillectomy, sleeping w/ chest raised. Does not sleep with oxygen. Pt does not have a dental appliance or braces on teeth. Does not teeth grinding. Does not report nightmares, sleep walking, dreaming during naps. When angry or laughing Soha Acuna does not  report cataplexy. he does not report hallucinations when dozing off or immediately upon awakening. Does not report sleep paralysis. Did  have parasomnia as child. Patient's [place] Sleepiness score  is required. Patient  is CONSISTENT with moderate daytime sleepiness. Shortness of Breath  This is a new problem. The current episode started more than 1 month ago. The problem occurs intermittently. The problem has been waxing and waning. Pertinent negatives include no abdominal pain, chest pain, claudication, coryza, ear pain, fever, headaches, hemoptysis, leg pain, leg swelling, neck pain, orthopnea, PND, rash, rhinorrhea, sore throat, sputum production, swollen glands, syncope, vomiting or wheezing. Review of Systems   Constitutional: Negative. Negative for fever. HENT: Negative. Negative for ear pain, rhinorrhea and sore throat. Eyes: Negative. Respiratory: Positive for shortness of breath. Negative for hemoptysis, sputum production and wheezing. Cardiovascular: Negative. Negative for chest pain, orthopnea, claudication, leg swelling, syncope and PND. Gastrointestinal: Negative. Negative for abdominal pain and vomiting. Endocrine: Negative. Genitourinary: Negative. Musculoskeletal: Negative. Negative for neck pain. Skin: Negative. Negative for rash. Allergic/Immunologic: Negative. Neurological: Negative. Negative for headaches. Hematological: Negative. Psychiatric/Behavioral: Negative. Objective   Physical Exam  Vitals and nursing note reviewed. Constitutional:       General: He is not in acute distress. Appearance: Normal appearance. He is not ill-appearing. HENT:      Head: Normocephalic and atraumatic. Right Ear: External ear normal.      Left Ear: External ear normal.      Nose: Nose normal.      Mouth/Throat:      Mouth: Mucous membranes are moist.      Pharynx: Oropharynx is clear. Comments: Mallampati 3  Eyes:      General: No scleral icterus. Extraocular Movements: Extraocular movements intact. Conjunctiva/sclera: Conjunctivae normal.      Pupils: Pupils are equal, round, and reactive to light. Cardiovascular:      Rate and Rhythm: Normal rate and regular rhythm. Pulses: Normal pulses. Heart sounds: Normal heart sounds. No murmur heard. No friction rub. Pulmonary:      Effort: Pulmonary effort is normal. No respiratory distress. Breath sounds: Normal breath sounds. No stridor. No wheezing, rhonchi or rales. Chest:      Chest wall: No tenderness. Abdominal:      General: Abdomen is flat. Bowel sounds are normal. There is no distension. Tenderness: There is no abdominal tenderness. There is no guarding. Musculoskeletal:         General: No swelling or tenderness. Normal range of motion. Cervical back: Normal range of motion and neck supple. No rigidity. Skin:     General: Skin is warm and dry. Coloration: Skin is not jaundiced. Neurological:      General: No focal deficit present. Mental Status: He is alert and oriented to person, place, and time. Mental status is at baseline. Cranial Nerves: No cranial nerve deficit. Sensory: No sensory deficit. Motor: No weakness. Gait: Gait normal.   Psychiatric:         Mood and Affect: Mood normal.         Thought Content: Thought content normal.         Judgment: Judgment normal.                Narrative   EXAMINATION:   CT OF THE CHEST WITH CONTRAST 8/20/2021 3:17 pm       TECHNIQUE:   CT of the chest was performed with the administration of intravenous   contrast. Multiplanar reformatted images are provided for review.  Dose modulation, iterative reconstruction, and/or weight based adjustment of the   mA/kV was utilized to reduce the radiation dose to as low as reasonably   achievable.       COMPARISON:   Chest x-ray August 13, 2021       HISTORY:   ORDERING SYSTEM PROVIDED HISTORY: Abnormal chest x-ray   TECHNOLOGIST PROVIDED HISTORY:   Reason for exam:->abnormal chest xray, showing a lung nodule   Reason for Exam: recent cxr showed lung nodule-c/o sob x 2 months   Acuity: Acute   Type of Exam: Initial   Additional signs and symptoms: former smoker x 4 yrs       FINDINGS:   Mediastinum: Normal size lymph nodes.  No adenopathy.  No acute findings.  No   pericardial effusion.       Lungs/pleura: Lungs are clear.  No pulmonary nodule in the area of concern   based on the recent chest x-ray or elsewhere.  No pneumonia or pulmonary   edema.  No pneumothorax or pleural effusion.       Upper Abdomen:  There is a 1.1 cm left adrenal nodule.  Hounsfield unit value   of 30.23.  No abnormality elsewhere.       Soft Tissues/Bones: No significant osseous findings.  Bilateral gynecomastia   noted incidentally.  Normal size lymph nodes.           Impression   No pulmonary nodule.  Findings on the recent chest x-ray likely due to   superimposition artifact of vasculature simulating a nodule.       No evidence of acute cardiopulmonary process.       1.1 cm indeterminate left adrenal nodule.  This is most likely an incidental   adenoma but cannot be diagnosed as such on this examination.  Recommendation   for this scenario is 1 year follow-up adrenal washout pre/post contrast CT,   to be performed 1 year now, assuming there is no history of cancer.  If there   is history of cancer than a metastatic lesion would be a concern and   whole-body PET-CT would be recommended       Narrative   EXAMINATION:   TWO XRAY VIEWS OF THE CHEST       8/13/2021 11:53 am       COMPARISON:   12/09/2019       HISTORY:   ORDERING SYSTEM PROVIDED HISTORY: Shortness of breath TECHNOLOGIST PROVIDED HISTORY:   Reason for exam:->Shortness of breath   Reason for Exam: SOB, cough   Acuity: Acute   Type of Exam: Initial       FINDINGS:   Frontal and lateral views of the chest were performed. Jordan Greco is no acute   skeletal abnormality.  The heart size and mediastinal contours are stable,   and within normal limits.  The lungs are clear, without evidence of acute   airspace consolidation, pneumothorax, or pleural effusion.  However, there is   an apparent 4 mm nodular opacity overlying the midthoracic spine on the   lateral view, possibly a pulmonary nodule, new from prior exam.           Impression   1. No acute airspace consolidation. 2. 4 mm nodular opacity overlying the midthoracic spine on the lateral view,   possibly a pulmonary nodule.  Recommend further characterization with a chest   CT, preferably with contrast.   The findings were sent to the Radiology Results [address] at 12:38   pm on 8/13/2021to be communicated to a licensed caregiver.                An electronic signature was used to authenticate this note.    --Nuzhat Cano MD

## 2021-08-27 NOTE — PROGRESS NOTES
MA Communication:   The following orders are received by verbal communication from Izabella Ocasio MD    Orders include:  PFT/MCT scheduled 10/1/21       3 mo fu scheduled 12/17/21

## 2021-11-05 ENCOUNTER — HOSPITAL ENCOUNTER (OUTPATIENT)
Dept: PULMONOLOGY | Age: 31
Discharge: HOME | End: 2021-11-05
Payer: MEDICARE

## 2021-11-05 VITALS — OXYGEN SATURATION: 97 % | RESPIRATION RATE: 18 BRPM

## 2021-11-05 DIAGNOSIS — Z72.0 TOBACCO ABUSE: ICD-10-CM

## 2021-11-05 DIAGNOSIS — R06.02 SHORTNESS OF BREATH: ICD-10-CM

## 2021-11-05 PROCEDURE — 94010 BREATHING CAPACITY TEST: CPT

## 2021-11-05 PROCEDURE — 94070 EVALUATION OF WHEEZING: CPT

## 2021-11-05 PROCEDURE — 94729 DIFFUSING CAPACITY: CPT

## 2021-11-05 PROCEDURE — 94726 PLETHYSMOGRAPHY LUNG VOLUMES: CPT

## 2021-11-05 PROCEDURE — 6360000002 HC RX W HCPCS: Performed by: INTERNAL MEDICINE

## 2021-11-05 PROCEDURE — 94760 N-INVAS EAR/PLS OXIMETRY 1: CPT

## 2021-11-05 RX ORDER — ALBUTEROL SULFATE 2.5 MG/3ML
2.5 SOLUTION RESPIRATORY (INHALATION) ONCE
Status: COMPLETED | OUTPATIENT
Start: 2021-11-05 | End: 2021-11-05

## 2021-11-09 ENCOUNTER — TELEPHONE (OUTPATIENT)
Dept: PULMONOLOGY | Age: 31
End: 2021-11-09

## 2021-11-09 PROCEDURE — 94726 PLETHYSMOGRAPHY LUNG VOLUMES: CPT | Performed by: INTERNAL MEDICINE

## 2021-11-09 PROCEDURE — 94729 DIFFUSING CAPACITY: CPT | Performed by: INTERNAL MEDICINE

## 2021-11-09 PROCEDURE — 94010 BREATHING CAPACITY TEST: CPT | Performed by: INTERNAL MEDICINE

## 2021-11-09 PROCEDURE — 94070 EVALUATION OF WHEEZING: CPT | Performed by: INTERNAL MEDICINE

## 2021-11-09 NOTE — PROCEDURES
[facility], [place] [address]                               PULMONARY FUNCTION    PATIENT NAME: Halie Guillory                :        1990  MED REC NO:   [id number]                          ROOM:  ACCOUNT NO:   [de-identified]                           ADMIT DATE: 2021  PROVIDER:     Koki Gallardo MD    DATE OF PROCEDURE:  2021    PFT AND METHACHOLINE CHALLENGE. The first is a spirometry, FEV1 of 3.58, 92% predicted; FVC of 4.46, 95%  predicted, FEV1 to FVC ratio of 80% showing no obstructive lung defect. Lung volumes do show some mild restrictive lung defect. There is no air  trapping or hyperinflation. Diffusion is normal.    IMPRESSION:  Normal spirometry. Possible mild restrictive lung defect  with no normal diffusion. Flow volume loops are consistent with the  diagnosis. Methacholine Challenge:  After two doses, there was a positive response  to methacholine with a drop of more than 20%. IMPRESSION:  Positive methacholine challenge.         Lisa Dietz MD    D: 2021 14:12:02       T: 2021 15:49:55     [email or web address]  Job#: [id number]     Doc#: [id number]    CC:

## 2021-11-10 NOTE — TELEPHONE ENCOUNTER
Please tell him that he is positive for asthma with a methacholine challenge and that the spirometry and other PFT was normal.      Continue with the albuterol  stop smoking  less to the sleep study and see what else is going on

## 2022-02-23 ENCOUNTER — OFFICE VISIT (OUTPATIENT)
Dept: PULMONOLOGY | Age: 32
End: 2022-02-23
Payer: MEDICARE

## 2022-02-23 VITALS
HEART RATE: 96 BPM | WEIGHT: 234 LBS | RESPIRATION RATE: 16 BRPM | BODY MASS INDEX: 38.99 KG/M2 | DIASTOLIC BLOOD PRESSURE: 82 MMHG | OXYGEN SATURATION: 100 % | HEIGHT: 65 IN | TEMPERATURE: 98.3 F | SYSTOLIC BLOOD PRESSURE: 125 MMHG

## 2022-02-23 DIAGNOSIS — E66.9 CLASS 2 OBESITY WITHOUT SERIOUS COMORBIDITY WITH BODY MASS INDEX (BMI) OF 36.0 TO 36.9 IN ADULT, UNSPECIFIED OBESITY TYPE: ICD-10-CM

## 2022-02-23 DIAGNOSIS — E66.01 SEVERE OBESITY (BMI 35.0-39.9) WITH COMORBIDITY (HCC): ICD-10-CM

## 2022-02-23 DIAGNOSIS — G47.10 HYPERSOMNOLENCE: ICD-10-CM

## 2022-02-23 DIAGNOSIS — R91.1 LUNG NODULE: Primary | ICD-10-CM

## 2022-02-23 DIAGNOSIS — Z72.0 TOBACCO ABUSE: ICD-10-CM

## 2022-02-23 DIAGNOSIS — R06.02 SHORTNESS OF BREATH: ICD-10-CM

## 2022-02-23 DIAGNOSIS — G47.33 OBSTRUCTIVE SLEEP APNEA: ICD-10-CM

## 2022-02-23 PROCEDURE — G8417 CALC BMI ABV UP PARAM F/U: HCPCS | Performed by: INTERNAL MEDICINE

## 2022-02-23 PROCEDURE — 99214 OFFICE O/P EST MOD 30 MIN: CPT | Performed by: INTERNAL MEDICINE

## 2022-02-23 PROCEDURE — G8484 FLU IMMUNIZE NO ADMIN: HCPCS | Performed by: INTERNAL MEDICINE

## 2022-02-23 PROCEDURE — G8427 DOCREV CUR MEDS BY ELIG CLIN: HCPCS | Performed by: INTERNAL MEDICINE

## 2022-02-23 PROCEDURE — 1036F TOBACCO NON-USER: CPT | Performed by: INTERNAL MEDICINE

## 2022-02-23 RX ORDER — FLUTICASONE FUROATE 200 UG/1
1 POWDER RESPIRATORY (INHALATION) DAILY
Qty: 1 EACH | Refills: 3 | Status: SHIPPED | OUTPATIENT
Start: 2022-02-23

## 2022-02-23 ASSESSMENT — ENCOUNTER SYMPTOMS
GASTROINTESTINAL NEGATIVE: 1
EYES NEGATIVE: 1
ALLERGIC/IMMUNOLOGIC NEGATIVE: 1
HEMOPTYSIS: 0
SORE THROAT: 0
ORTHOPNEA: 0
SHORTNESS OF BREATH: 1
SWOLLEN GLANDS: 0
VOMITING: 0
ABDOMINAL PAIN: 0
WHEEZING: 0
SPUTUM PRODUCTION: 0
RHINORRHEA: 0

## 2022-02-23 NOTE — PROGRESS NOTES
MA Communication:   The following orders are received by verbal communication from Sal Bailey MD    Orders include:      6 mon f/u

## 2022-02-23 NOTE — PATIENT INSTRUCTIONS
ASSESSMENT/PLAN:  1. Lung nodule  2. Shortness of breath  3. Class 2 obesity without serious comorbidity with body mass index (BMI) of 36.0 to 36.9 in adult, unspecified obesity type  4. Tobacco abuse  5. Obstructive sleep apnea  6. Hypersomnolence      Lung nodule  Chest x-ray done 8/13/2021  Impression   1. No acute airspace consolidation. 2. 4 mm nodular opacity overlying the midthoracic spine on the lateral view,   possibly a pulmonary nodule.  Recommend further characterization with a chest   CT, preferably with contrast.   The findings were sent to the Radiology Results [address] at 12:38   pm on 8/13/2021to be communicated to a licensed caregiver.             CT of the chest on 8/20/2021  Impression   No pulmonary nodule.  Findings on the recent chest x-ray likely due to   superimposition artifact of vasculature simulating a nodule.       No evidence of acute cardiopulmonary process.       1.1 cm indeterminate left adrenal nodule.  This is most likely an incidental   adenoma but cannot be diagnosed as such on this examination.  Recommendation   for this scenario is 1 year follow-up adrenal washout pre/post contrast CT,   to be performed 1 year now, assuming there is no history of cancer.  If there   is history of cancer than a metastatic lesion would be a concern and   whole-body PET-CT would be recommended           No lung nodule as per the Ct scan  However does have adrenal nodule  Agree with ct scan in one year. I  RECOMMENDATIONS:         Advised to avoid driving when too sleepy to function safely and given a discussion of the risks of untreated apnea such as accidents, cognitive impairment, mood impairment, high blood pressure, various cardiac diseases and stroke. Weight loss was encouraged.        ESS is 12/24  Neck size is 17 inches  Would recommend sleep study  But wants to wait      Tobacco use  Stop smoking      Shortness of breath  Will get   DATE OF PROCEDURE: 11/05/2021     PFT AND METHACHOLINE CHALLENGE.     The first is a spirometry, FEV1 of 3.58, 92% predicted; FVC of 4.46, 95%  predicted, FEV1 to FVC ratio of 80% showing no obstructive lung defect. Lung volumes do show some mild restrictive lung defect. There is no air  trapping or hyperinflation. Diffusion is normal.     IMPRESSION:  Normal spirometry. Possible mild restrictive lung defect  with no normal diffusion. Flow volume loops are consistent with the  diagnosis.     Methacholine Challenge:  After two doses, there was a positive response  to methacholine with a drop of more than 20%.    IMPRESSION:  Positive methacholine challenge.         Continue with:  Albuterol  2 puffs as needed three times a day    Will start on   Arnuity 200  Mcg  1 puff a day    Call me in 3 weeks about medication  [facility] not working[facility]    RT in 6 months    Remember to bring a list of pulmonary medications and any CPAP or BiPAP machines to your next appointment with the office. Please keep all of your future appointments scheduled by [facility] office. Out of respect for other patients and providers, you may be asked to reschedule your appointment if you arrive later than your scheduled appointment time. Appointments cancelled less than 24hrs in advance will be considered a no show. Patients with three missed appointments within 1 year or four missed appointments within 2 years can be dismissed from the practice. Please be aware that our physicians are required to work in the Intensive Care Unit at [facility].  Your appointment may need to be rescheduled if they are designated to work during your appointment time. You may receive a survey regarding the care you received during your visit. Your input is valuable to us. We encourage you to complete and return your survey. We hope you will choose us in the future for your healthcare needs.      Pt instructed of all future appointment dates & times, including radiology, labs, procedures & referrals. If procedures were scheduled preparation instructions provided. Instructions on future appointments with [facility] were given.

## 2022-02-23 NOTE — LETTER
2/23/22        Jass Zuniga      I have seen this patient in the office today and wanted to communicate my findings and recommendations. Patient Instructions     ASSESSMENT/PLAN:  1. Lung nodule  2. Shortness of breath  3. Class 2 obesity without serious comorbidity with body mass index (BMI) of 36.0 to 36.9 in adult, unspecified obesity type  4. Tobacco abuse  5. Obstructive sleep apnea  6. Hypersomnolence      Lung nodule  Chest x-ray done 8/13/2021  Impression   1. No acute airspace consolidation. 2. 4 mm nodular opacity overlying the midthoracic spine on the lateral view,   possibly a pulmonary nodule.  Recommend further characterization with a chest   CT, preferably with contrast.   The findings were sent to the Radiology Results [address] at 12:38   pm on 8/13/2021to be communicated to a licensed caregiver.             CT of the chest on 8/20/2021  Impression   No pulmonary nodule.  Findings on the recent chest x-ray likely due to   superimposition artifact of vasculature simulating a nodule.       No evidence of acute cardiopulmonary process.       1.1 cm indeterminate left adrenal nodule.  This is most likely an incidental   adenoma but cannot be diagnosed as such on this examination.  Recommendation   for this scenario is 1 year follow-up adrenal washout pre/post contrast CT,   to be performed 1 year now, assuming there is no history of cancer.  If there   is history of cancer than a metastatic lesion would be a concern and   whole-body PET-CT would be recommended           No lung nodule as per the Ct scan  However does have adrenal nodule  Agree with ct scan in one year. I  RECOMMENDATIONS:         Advised to avoid driving when too sleepy to function safely and given a discussion of the risks of untreated apnea such as accidents, cognitive impairment, mood impairment, high blood pressure, various cardiac diseases and stroke. Weight loss was encouraged.        ESS is 12/24  Neck size is 17 inches  Would recommend sleep study  But wants to wait      Tobacco use  Stop smoking      Shortness of breath  Will get   DATE OF PROCEDURE:  11/05/2021     PFT AND METHACHOLINE CHALLENGE.     The first is a spirometry, FEV1 of 3.58, 92% predicted; FVC of 4.46, 95%  predicted, FEV1 to FVC ratio of 80% showing no obstructive lung defect. Lung volumes do show some mild restrictive lung defect. There is no air  trapping or hyperinflation. Diffusion is normal.     IMPRESSION:  Normal spirometry. Possible mild restrictive lung defect  with no normal diffusion. Flow volume loops are consistent with the  diagnosis.     Methacholine Challenge:  After two doses, there was a positive response  to methacholine with a drop of more than 20%.      IMPRESSION:  Positive methacholine challenge.         Continue with:  Albuterol  2 puffs as needed three times a day    Will start on   Arnuity 200  Mcg  1 puff a day    Call me in 3 weeks about medication  [facility]    RTC in 6 months                     Thank you for allowing me to assist in the care of the Fabby Vergara MD

## 2022-02-23 NOTE — PROGRESS NOTES
Miguelangel Singh (:  1990) is a 28 y.o. male,New patient, here for evaluation of the following chief complaint(s):  Shortness of Breath         ASSESSMENT/PLAN:  1. Lung nodule  2. Shortness of breath  3. Class 2 obesity without serious comorbidity with body mass index (BMI) of 36.0 to 36.9 in adult, unspecified obesity type  4. Tobacco abuse  5. Obstructive sleep apnea  6. Hypersomnolence      Lung nodule  Chest x-ray done 2021  Impression   1. No acute airspace consolidation. 2. 4 mm nodular opacity overlying the midthoracic spine on the lateral view,   possibly a pulmonary nodule.  Recommend further characterization with a chest   CT, preferably with contrast.   The findings were sent to the Radiology Results [address] at 12:38   pm on 2021to be communicated to a licensed caregiver.             CT of the chest on 2021  Impression   No pulmonary nodule.  Findings on the recent chest x-ray likely due to   superimposition artifact of vasculature simulating a nodule.       No evidence of acute cardiopulmonary process.       1.1 cm indeterminate left adrenal nodule.  This is most likely an incidental   adenoma but cannot be diagnosed as such on this examination.  Recommendation   for this scenario is 1 year follow-up adrenal washout pre/post contrast CT,   to be performed 1 year now, assuming there is no history of cancer.  If there   is history of cancer than a metastatic lesion would be a concern and   whole-body PET-CT would be recommended           No lung nodule as per the Ct scan  However does have adrenal nodule  Agree with ct scan in one year. I  RECOMMENDATIONS:         Advised to avoid driving when too sleepy to function safely and given a discussion of the risks of untreated apnea such as accidents, cognitive impairment, mood impairment, high blood pressure, various cardiac diseases and stroke. Weight loss was encouraged.        ESS is   Neck size is 17 marcelina  Would recommend sleep study  But wants to wait      Tobacco use  Stop smoking      Shortness of breath  Will get   DATE OF PROCEDURE:  11/05/2021     PFT AND METHACHOLINE CHALLENGE.     The first is a spirometry, FEV1 of 3.58, 92% predicted; FVC of 4.46, 95%  predicted, FEV1 to FVC ratio of 80% showing no obstructive lung defect. Lung volumes do show some mild restrictive lung defect. There is no air  trapping or hyperinflation. Diffusion is normal.     IMPRESSION:  Normal spirometry. Possible mild restrictive lung defect  with no normal diffusion. Flow volume loops are consistent with the  diagnosis.     Methacholine Challenge:  After two doses, there was a positive response  to methacholine with a drop of more than 20%.    IMPRESSION:  Positive methacholine challenge.         Continue with:  Albuterol  2 puffs as needed three times a day    Will start on   Arnuity 200  Mcg  1 puff a day    Call me in 3 weeks about medication  If not working, will go to [facility] or "[employer]    RTC in 6 months      No follow-ups on file. Subjective   SUBJECTIVE/OBJECTIVE:  Consult from DONAVON Parson  For lung nodule  Initially seen by me on 8/27/2021          Had cxr done b/c of sob  But smoking 1/2 ppd   For about 3-4 years      Sob for the past 3-4 weeks    With laying down will get sob  And getting up    Lost weight, not affecting      Some sob and cough in the middle of night    + snore  + dry mouth    No headache  No nocturia  Toss and turn  Some sleepiness, more as per mom      Subjective:              35old year old,male, with PMH significant for obesity,  that presents today for initial evaluation. Pt reports snoring for several years  and that it is getting same. Pt reports does snore moderate for years. Pt's  does wake   himself with drymouth, snoring, sweating, sore throat, . Pt does report fatigue or tiredness frequently.  Pt sleeps more than 7 hours, and at time overwhelming sleepiness attacks. Pt  does dozes unintentionally while watching TV. While driving  Does not feel drowsy / nod off / fall sleep at stop lights. Pt does not have h/o sleepiness associated wrecks/near wrecks. Pt does  nod off while  unattended. Pt does not report having restless legs 0 times a week. This is often accompanied by leg jerks during sleep, numbness in legs or feet, aches/burning/cramps in legs, feet. Does  report having nasal congestion. Does not for use of nasal sprays, nose or sinus surgery. Does not for broken nose, tonsillectomy, sleeping w/ chest raised. Does not sleep with oxygen. Pt does not have a dental appliance or braces on teeth. Does not teeth grinding. Does not report nightmares, sleep walking, dreaming during naps. When angry or laughing Suman Boxer does not  report cataplexy. he does not report hallucinations when dozing off or immediately upon awakening. Does not report sleep paralysis. Did  have parasomnia as child. Patient's Byers Sleepiness score  is required. Patient  is CONSISTENT with moderate daytime sleepiness. Since last visit  Using albuterol  1-2 times a day      Still smoking  didn' t do the sleep study            fhx  adopted      Shortness of Breath  This is a new problem. The current episode started more than 1 month ago. The problem occurs intermittently. The problem has been waxing and waning. Pertinent negatives include no abdominal pain, chest pain, claudication, coryza, ear pain, fever, headaches, hemoptysis, leg pain, leg swelling, neck pain, orthopnea, PND, rash, rhinorrhea, sore throat, sputum production, swollen glands, syncope, vomiting or wheezing. Review of Systems   Constitutional: Negative. Negative for fever. HENT: Negative. Negative for ear pain, rhinorrhea and sore throat. Eyes: Negative. Respiratory: Positive for shortness of breath. Negative for hemoptysis, sputum production and wheezing. Musculoskeletal:         General: No swelling or tenderness. Normal range of motion. Cervical back: Normal range of motion and neck supple. No rigidity. Skin:     General: Skin is warm and dry. Coloration: Skin is not jaundiced. Neurological:      General: No focal deficit present. Mental Status: He is alert and oriented to person, place, and time. Mental status is at baseline. Cranial Nerves: No cranial nerve deficit. Sensory: No sensory deficit. Motor: No weakness. Gait: Gait normal.   Psychiatric:         Mood and Affect: Mood normal.         Thought Content: Thought content normal.         Judgment: Judgment normal.                Narrative   EXAMINATION:   CT OF THE CHEST WITH CONTRAST 8/20/2021 3:17 pm       TECHNIQUE:   CT of the chest was performed with the administration of intravenous   contrast. Multiplanar reformatted images are provided for review. Dose   modulation, iterative reconstruction, and/or weight based adjustment of the   mA/kV was utilized to reduce the radiation dose to as low as reasonably   achievable.       COMPARISON:   Chest x-ray August 13, 2021       HISTORY:   ORDERING SYSTEM PROVIDED HISTORY: Abnormal chest x-ray   TECHNOLOGIST PROVIDED HISTORY:   Reason for exam:->abnormal chest xray, showing a lung nodule   Reason for Exam: recent cxr showed lung nodule-c/o sob x 2 months   Acuity: Acute   Type of Exam: Initial   Additional signs and symptoms: former smoker x 4 yrs       FINDINGS:   Mediastinum: Normal size lymph nodes.  No adenopathy.  No acute findings.  No   pericardial effusion.       Lungs/pleura: Lungs are clear.  No pulmonary nodule in the area of concern   based on the recent chest x-ray or elsewhere.  No pneumonia or pulmonary   edema.  No pneumothorax or pleural effusion.       Upper Abdomen:  There is a 1.1 cm left adrenal nodule.  Hounsfield unit value   of 30.23.  No abnormality elsewhere.       Soft Tissues/Bones: No electronic signature was used to authenticate this note.    --James Houston MD

## 2022-03-29 ENCOUNTER — OFFICE VISIT (OUTPATIENT)
Dept: FAMILY MEDICINE CLINIC | Age: 32
End: 2022-03-29
Payer: MEDICARE

## 2022-03-29 VITALS
WEIGHT: 227 LBS | BODY MASS INDEX: 37.77 KG/M2 | SYSTOLIC BLOOD PRESSURE: 119 MMHG | DIASTOLIC BLOOD PRESSURE: 79 MMHG | OXYGEN SATURATION: 95 % | HEART RATE: 89 BPM

## 2022-03-29 DIAGNOSIS — F39 MOOD DISORDER (HCC): ICD-10-CM

## 2022-03-29 DIAGNOSIS — E78.2 MIXED HYPERLIPIDEMIA: ICD-10-CM

## 2022-03-29 DIAGNOSIS — Z00.00 ANNUAL PHYSICAL EXAM: Primary | ICD-10-CM

## 2022-03-29 DIAGNOSIS — E27.8 ADRENAL NODULE (HCC): ICD-10-CM

## 2022-03-29 DIAGNOSIS — R15.9 ENCOPRESIS WITH CONSTIPATION AND OVERFLOW INCONTINENCE: ICD-10-CM

## 2022-03-29 PROBLEM — E27.9 ADRENAL NODULE (HCC): Status: ACTIVE | Noted: 2022-03-29

## 2022-03-29 LAB
A/G RATIO: 2 (ref 1.1–2.2)
ALBUMIN SERPL-MCNC: 4.3 G/DL (ref 3.4–5)
ALP BLD-CCNC: 60 U/L (ref 40–129)
ALT SERPL-CCNC: 48 U/L (ref 10–40)
ANION GAP SERPL CALCULATED.3IONS-SCNC: 13 MMOL/L (ref 3–16)
AST SERPL-CCNC: 25 U/L (ref 15–37)
BASOPHILS ABSOLUTE: 0 K/UL (ref 0–0.2)
BASOPHILS RELATIVE PERCENT: 0.5 %
BILIRUB SERPL-MCNC: 1 MG/DL (ref 0–1)
BUN BLDV-MCNC: 16 MG/DL (ref 7–20)
CALCIUM SERPL-MCNC: 9.5 MG/DL (ref 8.3–10.6)
CHLORIDE BLD-SCNC: 103 MMOL/L (ref 99–110)
CHOLESTEROL, TOTAL: 150 MG/DL (ref 0–199)
CO2: 24 MMOL/L (ref 21–32)
CREAT SERPL-MCNC: 1.2 MG/DL (ref 0.9–1.3)
EOSINOPHILS ABSOLUTE: 0 K/UL (ref 0–0.6)
EOSINOPHILS RELATIVE PERCENT: 0.1 %
GFR AFRICAN AMERICAN: >60
GFR NON-AFRICAN AMERICAN: >60
GLUCOSE BLD-MCNC: 83 MG/DL (ref 70–99)
HCT VFR BLD CALC: 46.2 % (ref 40.5–52.5)
HDLC SERPL-MCNC: 46 MG/DL (ref 40–60)
HEMOGLOBIN: 15.9 G/DL (ref 13.5–17.5)
LDL CHOLESTEROL CALCULATED: 72 MG/DL
LYMPHOCYTES ABSOLUTE: 1.3 K/UL (ref 1–5.1)
LYMPHOCYTES RELATIVE PERCENT: 24.3 %
MCH RBC QN AUTO: 27.9 PG (ref 26–34)
MCHC RBC AUTO-ENTMCNC: 34.5 G/DL (ref 31–36)
MCV RBC AUTO: 81 FL (ref 80–100)
MONOCYTES ABSOLUTE: 0.4 K/UL (ref 0–1.3)
MONOCYTES RELATIVE PERCENT: 6.9 %
NEUTROPHILS ABSOLUTE: 3.6 K/UL (ref 1.7–7.7)
NEUTROPHILS RELATIVE PERCENT: 68.2 %
PDW BLD-RTO: 13.9 % (ref 12.4–15.4)
PLATELET # BLD: 272 K/UL (ref 135–450)
PMV BLD AUTO: 7.7 FL (ref 5–10.5)
POTASSIUM SERPL-SCNC: 4.6 MMOL/L (ref 3.5–5.1)
RBC # BLD: 5.71 M/UL (ref 4.2–5.9)
SODIUM BLD-SCNC: 140 MMOL/L (ref 136–145)
TOTAL PROTEIN: 6.5 G/DL (ref 6.4–8.2)
TRIGL SERPL-MCNC: 158 MG/DL (ref 0–150)
VLDLC SERPL CALC-MCNC: 32 MG/DL
WBC # BLD: 5.2 K/UL (ref 4–11)

## 2022-03-29 PROCEDURE — 36415 COLL VENOUS BLD VENIPUNCTURE: CPT | Performed by: FAMILY MEDICINE

## 2022-03-29 PROCEDURE — 99395 PREV VISIT EST AGE 18-39: CPT | Performed by: FAMILY MEDICINE

## 2022-03-29 PROCEDURE — G8484 FLU IMMUNIZE NO ADMIN: HCPCS | Performed by: FAMILY MEDICINE

## 2022-03-29 ASSESSMENT — PATIENT HEALTH QUESTIONNAIRE - PHQ9
SUM OF ALL RESPONSES TO PHQ QUESTIONS 1-9: 7
10. IF YOU CHECKED OFF ANY PROBLEMS, HOW DIFFICULT HAVE THESE PROBLEMS MADE IT FOR YOU TO DO YOUR WORK, TAKE CARE OF THINGS AT HOME, OR GET ALONG WITH OTHER PEOPLE: 3
2. FEELING DOWN, DEPRESSED OR HOPELESS: 0
8. MOVING OR SPEAKING SO SLOWLY THAT OTHER PEOPLE COULD HAVE NOTICED. OR THE OPPOSITE, BEING SO FIGETY OR RESTLESS THAT YOU HAVE BEEN MOVING AROUND A LOT MORE THAN USUAL: 0
3. TROUBLE FALLING OR STAYING ASLEEP: 3
4. FEELING TIRED OR HAVING LITTLE ENERGY: 2
7. TROUBLE CONCENTRATING ON THINGS, SUCH AS READING THE NEWSPAPER OR WATCHING TELEVISION: 0
SUM OF ALL RESPONSES TO PHQ QUESTIONS 1-9: 7
9. THOUGHTS THAT YOU WOULD BE BETTER OFF DEAD, OR OF HURTING YOURSELF: 0
6. FEELING BAD ABOUT YOURSELF - OR THAT YOU ARE A FAILURE OR HAVE LET YOURSELF OR YOUR FAMILY DOWN: 0
SUM OF ALL RESPONSES TO PHQ QUESTIONS 1-9: 7
1. LITTLE INTEREST OR PLEASURE IN DOING THINGS: 0
SUM OF ALL RESPONSES TO PHQ QUESTIONS 1-9: 7
5. POOR APPETITE OR OVEREATING: 2
SUM OF ALL RESPONSES TO PHQ9 QUESTIONS 1 & 2: 0

## 2022-03-29 NOTE — PATIENT INSTRUCTIONS
Call to schedule CT at [phone]     Patient should call the office immediately with new or ongoing signs or symptoms or worsening, or proceed to the emergency room. If you are on medications which could impair your senses, you are at risk of weakness, falls, dizziness, or drowsiness. You should be careful during activities which could place you at risk of harm, such as climbing, using stairs, operating machinery, or driving vehicles. If you feel you cannot safely do these activities, you should request others to help you, or avoid the activities altogether. If you are drowsy for any other reason, you should use the same precautions as listed above.      Web Address for Advance Directive:    [email or web address]

## 2022-03-29 NOTE — PROGRESS NOTES
Chief Complaint   Patient presents with    Annual Exam    Anxiety    Depression        Internal Administration   First Dose      Second Dose           Last COVID Lab No results found for: SARS-COV-2, SARS-COV-2 RNA, SARS-COV-2, SARS-COV-2, SARS-COV-2 BY PCR, SARS-COV-2, SARS-COV-2, SARS-COV-2          Wt Readings from Last 3 Encounters:   22 227 lb (103 kg)   22 234 lb (106.1 kg)   21 221 lb (100.2 kg)     BP Readings from Last 3 Encounters:   22 119/79   22 125/82   21 127/84      No results found for: LABA1C    HPI:  Adrianne Diaz is a 28 y.o. (: 1990) here today for    Patient states he has quit smoking. Patient states that he is doing better with his mood and nerves. Patient continues to see Zachary. Patient had an adrenal scan and it wants a follow up scan to be done in a year. We are going to place order and patient is going to schedule for August of this year. Patient denies any questions or concerns. [] Patient has completed an advance directive  [x] Patient has NOT completed an advanced directive  [] Patient has a documented healthcare surrogate  [x] Patient does NOT have a documented healthcare surrogate  [] Discussed the importance of establishing and updating an advanced directive. Patient has questions at this time and those were answered. [x] Discussed the importance of establishing and updating an advanced directive. Patient does NOT have questions at this time. Discussed with: [x] Patient            [] Family             [] Other caregiver    Patient's medications, allergies, past medical, surgical, social and family histories were reviewed and updated asappropriate on 3/29/2022 at 10:12 AM.    ROS:  Review of Systems    All other systems reviewed and are negative except as noted above on 3/29/2022 at 10:12 AM. Additional review of systems may be scanned into the media section ofthis medical record.   Any responses requiring further intervention were pursued. Past Medical History:   Diagnosis Date    Acid reflux     ADHD     [facility]     Hyperlipidemia     Obesity     OCD (obsessive compulsive disorder)     Schizo affective schizophrenia ([facility]     Seizures ([facility]      Family History   Adopted: Yes   Problem Relation Age of Onset    Mental Illness Mother     Substance Abuse Mother     Mental Illness Father     Substance Abuse Father      Social History     Socioeconomic History    Marital status: Single     Spouse name: Not on file    Number of children: 0    Years of education: 15    Highest education level: Not on file   Occupational History    Occupation: unemployed   Tobacco Use    Smoking status: Former Smoker     Packs/day: 0.50     Years: 4.00     Pack years: 2.00     Types: Cigarettes     Start date:      Quit date: 2021     Years since quittin.7    Smokeless tobacco: Former User    Tobacco comment: smokes about a pack every 2 day   Vaping Use    Vaping Use: Some days    Substances: Never   Substance and Sexual Activity    Alcohol use: No     Alcohol/week: 0.0 standard drinks    Drug use: No    Sexual activity: Not Currently     Partners: Female   Other Topics Concern    Not on file   Social History Narrative    Not on file     Social Determinants of Health     Financial Resource Strain: Low Risk     Difficulty of Paying Living Expenses: Not hard at all   Food Insecurity: No Food Insecurity    Worried About [address] in the Last Year: Never true    [address] in the Last Year: Never true   Transportation Needs:     Lack of Transportation (Medical): Not on file    Lack of Transportation (Non-Medical):  Not on file   Physical Activity:     Days of Exercise per Week: Not on file    Minutes of Exercise per Session: Not on file   Stress:     Feeling of Stress : Not on file   Social Connections:     Frequency of Communication with Friends and Family: Not on file    Frequency of Social Gatherings with Friends and Family: Not on file    Attends Mormonism Services: Not on file    Active Member of Clubs or Organizations: Not on file    Attends Club or Organization Meetings: Not on file    Marital Status: Not on file   Intimate Partner Violence:     Fear of Current or Ex-Partner: Not on file    Emotionally Abused: Not on file    Physically Abused: Not on file    Sexually Abused: Not on file   Housing Stability:     Unable to Pay for Housing in the Last Year: Not on file    Number of [place] in the Last Year: Not on file    Unstable Housing in the Last Year: Not on file     Prior to Visit Medications    Medication Sig Taking? Authorizing Provider   fluticasone (ARNUITY ELLIPTA) 200 MCG/ACT AEPB Inhale 1 puff into the lungs daily Yes Myrna Pearce MD   nystatin (MYCOSTATIN) 477495 UNIT/GM cream Apply topically 2 times daily. Yes DONAVON Ma CNP   albuterol sulfate HFA (VENTOLIN HFA) 108 (90 Base) MCG/ACT inhaler Inhale 2 puffs into the lungs every 6 hours as needed for Wheezing or Shortness of Breath Yes DONAVON Ma CNP   atorvastatin (LIPITOR) 10 MG tablet TAKE 1 TABLET BY MOUTH EVERY DAY Yes El Costello MD   ondansetron (ZOFRAN) 4 MG tablet Take 1 tablet by mouth every 8 hours as needed for Nausea Yes Louise Arguello PA-C   diazepam (VALIUM) 5 MG tablet Take 5 mg by mouth every 6 hours as needed for Anxiety.  Yes Historical Provider, MD   Co-Enzyme Q10 200 MG CAPS Take 200 mg by mouth Yes Historical Provider, MD   buPROPion (WELLBUTRIN XL) 300 MG extended release tablet TAKE 1 TABLET BY MOUTH IN THE MORNING Yes Historical Provider, MD   minocycline (MINOCIN;DYNACIN) 100 MG capsule TAKE 1 CAPSULE EVERY DAY Yes Historical Provider, MD   ibuprofen (ADVIL;MOTRIN) 400 MG tablet Take 1 tablet by mouth every 6 hours as needed for Pain Yes DONAVON Joel CNP   ARIPiprazole (ABILIFY) 15 MG tablet Take 15 mg by mouth daily Take PRN in addition to the 30 mg tablet Yes Historical Provider, MD   FLUoxetine (PROZAC) 20 MG capsule Take 20 mg by mouth daily Yes Historical Provider, MD   amphetamine-dextroamphetamine (ADDERALL, 30MG,) 30 MG tablet Take 30 mg by mouth daily  Ordered by Dr. Jeremie Méndez  In 12/2016 . Yes Historical Provider, MD   traZODone (DESYREL) 100 MG tablet Take 100 mg by mouth nightly Yes Historical Provider, MD   ARIPiprazole (ABILIFY) 30 MG tablet Take 30 mg by mouth daily. Yes Historical Provider, MD   docusate sodium (COLACE) 100 MG capsule 2 at hs Yes Rajni Williamson MD     Allergies   Allergen Reactions    Molds & Smuts        OBJECTIVE:  Estimated body mass index is 37.77 kg/m² as calculated from the following:    Height as of 2/23/22: 5' 5\" (1.651 m). Weight as of this encounter: 227 lb (103 kg). Vitals:    03/29/22 1003   BP: 119/79   Pulse: 89   SpO2: 95%   Weight: 227 lb (103 kg)       Physical Exam  Vitals and nursing note reviewed. Constitutional:       General: He is not in acute distress. Appearance: He is well-developed. He is not diaphoretic. HENT:      Head: Normocephalic and atraumatic. Right Ear: External ear normal.      Left Ear: External ear normal.      Nose: Nose normal.   Eyes:      General: Lids are normal. No scleral icterus. Right eye: No discharge. Left eye: No discharge. Pupils: Pupils are equal, round, and reactive to light. Neck:      Thyroid: No thyromegaly. Vascular: No JVD. Cardiovascular:      Rate and Rhythm: Normal rate and regular rhythm. Heart sounds: Normal heart sounds. Pulmonary:      Effort: Pulmonary effort is normal. No respiratory distress. Breath sounds: Normal breath sounds. Abdominal:      Palpations: Abdomen is soft. There is no hepatomegaly or splenomegaly. Tenderness: There is no abdominal tenderness. Musculoskeletal:      Right lower leg: No edema.       Left lower leg: No edema. Skin:     General: Skin is warm and dry. Coloration: Skin is not pale. Findings: No erythema or rash. Comments: Turgor normal   Neurological:      Mental Status: He is oriented to person, place, and time. Psychiatric:         Mood and Affect: Mood is anxious. Behavior: Behavior is slowed. Thought Content: Thought content normal.         Judgment: Judgment normal.              ASSESSMENT PLAN      Diagnosis Orders   1. Annual physical exam  Comprehensive Metabolic Panel    CBC with Auto Differential   2. Adrenal nodule (HCC)  CT ABDOMEN PELVIS W WO CONTRAST Additional Contrast? None   3. Mood disorder ([address]     4. Mixed hyperlipidemia  LIPID PANEL   5. Encopresis with constipation and overflow incontinence     Patient overall doing better. Still seeing psychiatry. His mood is better. No longer experiencing encopresis. The adrenal nodule incidentally found on CAT scan before will be followed up with the protocol suggested by radiology in August 2022. If it is unremarkable at that point we will follow radiology recommendations for further surveillance. Lipids will be monitored based upon levels requiring treatment and other cardiac risks. Medications for hyperlipidemia and hypertriglyceridemia as listed on the medication list will be changed as necessary to reach control parameters. Follow-up 1 year    Patient should call the office immediately with new or ongoing signs or symptoms or worsening, or proceed to the emergency room. No changes in past medical history, past surgical history, social history, or family history were noted during the patient encounter unless specifically listed above. All updates of past medical history, past surgical history, social history, or family history were reviewed personally by me during the office visit. All problems listed in the assessment are stable unless noted otherwise.   Medication profile reviewed personally by me during the visit. Medication side effects and possible impairments from medications were discussed as applicable. This document was prepared by a combination of typing and transcription through a voice recognition software. Scribe attestation: Roman Cooley MA, am scribing for and in the presence of Virgil Napier MD. Electronically signed by Claudio Esteban MA on 3/29/2022 at 10:12 AM      Provider attestation:     I, Dr. mOer Quezada, personally performed the services described in this documentation, as scribed by the above signed scribe in my presence, and it is both accurate and complete. I agree with the ROS and Past Histories independently gathered by the clinical support staff and the remaining scribed note accurately describes my personal service to the patient.       3/29/2022    11:37 AM

## 2022-04-29 ENCOUNTER — HOSPITAL ENCOUNTER (EMERGENCY)
Age: 32
Discharge: HOME | End: 2022-04-29
Payer: MEDICARE

## 2022-04-29 ENCOUNTER — APPOINTMENT (OUTPATIENT)
Dept: GENERAL RADIOLOGY | Age: 32
End: 2022-04-29
Payer: MEDICARE

## 2022-04-29 VITALS
SYSTOLIC BLOOD PRESSURE: 138 MMHG | TEMPERATURE: 98.5 F | BODY MASS INDEX: 37.49 KG/M2 | OXYGEN SATURATION: 99 % | DIASTOLIC BLOOD PRESSURE: 78 MMHG | RESPIRATION RATE: 14 BRPM | WEIGHT: 225 LBS | HEIGHT: 65 IN | HEART RATE: 93 BPM

## 2022-04-29 DIAGNOSIS — R07.9 CHEST PAIN, UNSPECIFIED TYPE: Primary | ICD-10-CM

## 2022-04-29 LAB
A/G RATIO: 1.8 (ref 1.1–2.2)
ALBUMIN SERPL-MCNC: 4.7 G/DL (ref 3.4–5)
ALP BLD-CCNC: 82 U/L (ref 40–129)
ALT SERPL-CCNC: 56 U/L (ref 10–40)
ANION GAP SERPL CALCULATED.3IONS-SCNC: 12 MMOL/L (ref 3–16)
AST SERPL-CCNC: 32 U/L (ref 15–37)
BASOPHILS ABSOLUTE: 0 K/UL (ref 0–0.2)
BASOPHILS RELATIVE PERCENT: 0.4 %
BILIRUB SERPL-MCNC: 0.8 MG/DL (ref 0–1)
BUN BLDV-MCNC: 18 MG/DL (ref 7–20)
CALCIUM SERPL-MCNC: 10.2 MG/DL (ref 8.3–10.6)
CHLORIDE BLD-SCNC: 99 MMOL/L (ref 99–110)
CO2: 23 MMOL/L (ref 21–32)
CREAT SERPL-MCNC: 1 MG/DL (ref 0.9–1.3)
D DIMER: <200 NG/ML DDU (ref 0–229)
EKG ATRIAL RATE: 103 BPM
EKG DIAGNOSIS: NORMAL
EKG P AXIS: 32 DEGREES
EKG P-R INTERVAL: 140 MS
EKG Q-T INTERVAL: 316 MS
EKG QRS DURATION: 68 MS
EKG QTC CALCULATION (BAZETT): 413 MS
EKG R AXIS: 13 DEGREES
EKG T AXIS: 28 DEGREES
EKG VENTRICULAR RATE: 103 BPM
EOSINOPHILS ABSOLUTE: 0 K/UL (ref 0–0.6)
EOSINOPHILS RELATIVE PERCENT: 0.2 %
GFR AFRICAN AMERICAN: >60
GFR NON-AFRICAN AMERICAN: >60
GLUCOSE BLD-MCNC: 134 MG/DL (ref 70–99)
HCT VFR BLD CALC: 45.9 % (ref 40.5–52.5)
HEMOGLOBIN: 15.7 G/DL (ref 13.5–17.5)
LYMPHOCYTES ABSOLUTE: 1.2 K/UL (ref 1–5.1)
LYMPHOCYTES RELATIVE PERCENT: 13.7 %
MCH RBC QN AUTO: 27.7 PG (ref 26–34)
MCHC RBC AUTO-ENTMCNC: 34.2 G/DL (ref 31–36)
MCV RBC AUTO: 81.1 FL (ref 80–100)
MONOCYTES ABSOLUTE: 0.8 K/UL (ref 0–1.3)
MONOCYTES RELATIVE PERCENT: 9.7 %
NEUTROPHILS ABSOLUTE: 6.6 K/UL (ref 1.7–7.7)
NEUTROPHILS RELATIVE PERCENT: 76 %
PDW BLD-RTO: 13.5 % (ref 12.4–15.4)
PLATELET # BLD: 273 K/UL (ref 135–450)
PMV BLD AUTO: 7.5 FL (ref 5–10.5)
POTASSIUM REFLEX MAGNESIUM: 3.6 MMOL/L (ref 3.5–5.1)
RBC # BLD: 5.66 M/UL (ref 4.2–5.9)
SODIUM BLD-SCNC: 134 MMOL/L (ref 136–145)
TOTAL PROTEIN: 7.3 G/DL (ref 6.4–8.2)
TROPONIN: <0.01 NG/ML
WBC # BLD: 8.7 K/UL (ref 4–11)

## 2022-04-29 PROCEDURE — 80053 COMPREHEN METABOLIC PANEL: CPT

## 2022-04-29 PROCEDURE — 99285 EMERGENCY DEPT VISIT HI MDM: CPT

## 2022-04-29 PROCEDURE — 93010 ELECTROCARDIOGRAM REPORT: CPT | Performed by: INTERNAL MEDICINE

## 2022-04-29 PROCEDURE — 93005 ELECTROCARDIOGRAM TRACING: CPT | Performed by: EMERGENCY MEDICINE

## 2022-04-29 PROCEDURE — 85025 COMPLETE CBC W/AUTO DIFF WBC: CPT

## 2022-04-29 PROCEDURE — 71046 X-RAY EXAM CHEST 2 VIEWS: CPT

## 2022-04-29 PROCEDURE — 84484 ASSAY OF TROPONIN QUANT: CPT

## 2022-04-29 PROCEDURE — 85379 FIBRIN DEGRADATION QUANT: CPT

## 2022-04-29 RX ORDER — KETOROLAC TROMETHAMINE 30 MG/ML
15 INJECTION, SOLUTION INTRAMUSCULAR; INTRAVENOUS ONCE
Status: DISCONTINUED | OUTPATIENT
Start: 2022-04-29 | End: 2022-04-29 | Stop reason: HOSPADM

## 2022-04-29 ASSESSMENT — PAIN SCALES - GENERAL: PAINLEVEL_OUTOF10: 4

## 2022-04-29 ASSESSMENT — PAIN DESCRIPTION - LOCATION: LOCATION: CHEST

## 2022-04-29 ASSESSMENT — PAIN - FUNCTIONAL ASSESSMENT: PAIN_FUNCTIONAL_ASSESSMENT: 0-10

## 2022-04-29 ASSESSMENT — PAIN DESCRIPTION - DESCRIPTORS: DESCRIPTORS: PRESSURE

## 2022-04-29 NOTE — ED PROVIDER NOTES
[facility]  ED      CHIEF COMPLAINT  Chest Pain (chest pain started this morning. denies any cardiac hx. )      SHARED SERVICE VISIT  Evaluated by AGNES. My supervising physician was available for consultation. HISTORY OF PRESENT ILLNESS  David Sandoval is a 28 y.o. male history of HLD, BMI 37; presents to the ED for evaluation of chest pain. Patient states that he had this morning he developed pain in the center of his chest that occurred while he was eating. Denies of difficulty swallowing at this time. He states he does feel short of breath. Patient states the pain is worse with inspiration. No recent flights, surgeries, hemoptysis, unilateral leg swelling. No prior history of coagulopathy. No known family history of cardiac disease given adoption status. No other complaints, modifying factors or associated symptoms. Nursing notes reviewed.    Past Medical History:   Diagnosis Date    Acid reflux     ADHD     Bipolar affect, depressed (HCC)     Hyperlipidemia     Obesity     OCD (obsessive compulsive disorder)     Schizo affective schizophrenia ([address]     Seizures ([address]      Past Surgical History:   Procedure Laterality Date    TOOTH EXTRACTION      UPPER GASTROINTESTINAL ENDOSCOPY       Family History   Adopted: Yes   Problem Relation Age of Onset    Mental Illness Mother     Substance Abuse Mother     Mental Illness Father     Substance Abuse Father      Social History     Socioeconomic History    Marital status: Single     Spouse name: Not on file    Number of children: 0    Years of education: 15    Highest education level: Not on file   Occupational History    Occupation: unemployed   Tobacco Use    Smoking status: Current Every Day Smoker     Packs/day: 0.50     Years: 4.00     Pack years: 2.00     Types: Cigarettes     Start date: 2018    Smokeless tobacco: Former User   Vaping Use    Vaping Use: Some days    Substances: Never   Substance and Sexual Activity    Alcohol use: Yes     Alcohol/week: 0.0 standard drinks    Drug use: No    Sexual activity: Not Currently     Partners: Female   Other Topics Concern    Not on file   Social History Narrative    Not on file     Social Determinants of Health     Financial Resource Strain: Low Risk     Difficulty of Paying Living Expenses: Not hard at all   Food Insecurity: No Food Insecurity    Worried About [address] [employer] in the Last Year: Never true    [address] [facility] [address] in the Last Year: Never true   Transportation Needs:     Lack of Transportation (Medical): Not on file    Lack of Transportation (Non-Medical):  Not on file   Physical Activity:     Days of Exercise per Week: Not on file    Minutes of Exercise per Session: Not on file   Stress:     Feeling of Stress : Not on file   Social Connections:     Frequency of Communication with Friends and Family: Not on file    Frequency of Social Gatherings with Friends and Family: Not on file    Attends Orthodoxy Services: Not on file    Active Member of [address] [place] [address] or Organizations: Not on file    Attends Club or Organization Meetings: Not on file    Marital Status: Not on file   Intimate Partner Violence:     Fear of Current or Ex-Partner: Not on file    Emotionally Abused: Not on file    Physically Abused: Not on file    Sexually Abused: Not on file   Housing Stability:     Unable to Pay for Housing in the Last Year: Not on file    Number of [place] in the Last Year: Not on file    Unstable Housing in the Last Year: Not on file     Current Facility-Administered Medications   Medication Dose Route Frequency Provider Last Rate Last Admin    ketorolac (TORADOL) injection 15 mg  15 mg IntraVENous Once James Stephens PA-C         Current Outpatient Medications   Medication Sig Dispense Refill    fluticasone (ARNUITY ELLIPTA) 200 MCG/ACT AEPB Inhale 1 puff into the lungs daily 1 each 3    nystatin (MYCOSTATIN) 196226 UNIT/GM cream Apply topically 2 times daily. 60 g 3    albuterol sulfate HFA (VENTOLIN HFA) 108 (90 Base) MCG/ACT inhaler Inhale 2 puffs into the lungs every 6 hours as needed for Wheezing or Shortness of Breath 1 Inhaler 5    atorvastatin (LIPITOR) 10 MG tablet TAKE 1 TABLET BY MOUTH EVERY DAY 90 tablet 3    ondansetron (ZOFRAN) 4 MG tablet Take 1 tablet by mouth every 8 hours as needed for Nausea 10 tablet 0    diazepam (VALIUM) 5 MG tablet Take 5 mg by mouth every 6 hours as needed for Anxiety.  Co-Enzyme Q10 200 MG CAPS Take 200 mg by mouth      buPROPion (WELLBUTRIN XL) 300 MG extended release tablet TAKE 1 TABLET BY MOUTH IN THE MORNING  0    minocycline (MINOCIN;DYNACIN) 100 MG capsule TAKE 1 CAPSULE EVERY DAY  0    ibuprofen (ADVIL;MOTRIN) 400 MG tablet Take 1 tablet by mouth every 6 hours as needed for Pain 120 tablet 3    ARIPiprazole (ABILIFY) 15 MG tablet Take 15 mg by mouth daily Take PRN in addition to the 30 mg tablet      FLUoxetine (PROZAC) 20 MG capsule Take 20 mg by mouth daily  2    amphetamine-dextroamphetamine (ADDERALL, 30MG,) 30 MG tablet Take 30 mg by mouth daily  Ordered by Dr. Alfredo Keyes  In 12/2016 .  traZODone (DESYREL) 100 MG tablet Take 100 mg by mouth nightly      ARIPiprazole (ABILIFY) 30 MG tablet Take 30 mg by mouth daily.  docusate sodium (COLACE) 100 MG capsule 2 at hs 60 capsule 11     Allergies   Allergen Reactions    Molds & Smuts        REVIEW OF SYSTEMS  10 systems reviewed, pertinent positives per HPI otherwise noted to be negative    PHYSICAL EXAM  /78   Pulse 93   Temp 98.5 °F (36.9 °C)   Resp 14   Ht 5' 5\" (1.651 m)   Wt 225 lb (102.1 kg)   SpO2 99%   BMI 37.44 kg/m²   GENERAL APPEARANCE: Awake and alert. Cooperative. HEAD: Normocephalic. Atraumatic. EYES: EOM's grossly intact. ENT: Mucous membranes are moist.   NECK: Supple. HEART: RRR. No murmurs. LUNGS: Respirations unlabored. CTAB. Good air exchange.  Speaking comfortably in full sentences. EXTREMITIES: No peripheral edema. Moves all extremities equally. All extremities neurovascularly intact. SKIN: Warm and dry. No acute rashes. NEUROLOGICAL: Alert and oriented. CN's 2-12 intact. No gross facial drooping. Strength 5/5, sensation intact. PSYCHIATRIC: Normal mood and affect. RADIOLOGY  XR CHEST (2 VW)   Final Result   No acute process. LABS  Labs Reviewed   COMPREHENSIVE METABOLIC PANEL W/ REFLEX TO MG FOR LOW K - Abnormal; Notable for the following components:       Result Value    Sodium 134 (*)     Glucose 134 (*)     ALT 56 (*)     All other components within normal limits   CBC WITH AUTO DIFFERENTIAL   TROPONIN   D-DIMER, QUANTITATIVE       PROCEDURES  Unless otherwise noted below, none  Procedures    MDM  MDM  81-year-old female presents emergency department for evaluation of chest pain. He describes it as pleuritic chest pain. No prior history of coagulopathy. No prior history of cardiac disease. On arrival ED vitals within normal limits. Patient did take aspirin prior to arrival.  Given the heart rate of 100 on arrival patient cannot be excluded from further evaluation given the [facility] therefore D-dimer was obtained. Fortunately the D-dimer was obtained and was negative therefore further evaluation for underlying pulmonary embolism is not required. Lab work obtained demonstrated no leukocytosis. No kidney injury. Troponin was within normal limits. Chest x-ray was clear without any acute disease process. Please refer to attending note for EKG interpretation. Patient's heart score is documented below and at this point is considered low safe for discharge less than 1.7% of next 6 weeks. Recommend follow-up primary care for further evaluation management. Recommend taking anti-inflammatories as directed for the pain.     HEART Score for Major Cardiac Events from [email or web address]  on 4/29/2022  ** All calculations should be rechecked by clinician prior to use **    RESULT SUMMARY:  1 points  Low Score (0-3 points)    Risk of MACE of 0.9-1.7%. INPUTS:  History --> 0 = Slightly suspicious  EKG --> 0 = Normal  Age --> 0 = <45  Risk factors --> 1 = 1-2 risk factors  Initial troponin --> 0 = ?normal limit    DISPOSITION  Patient was discharged to home in good condition. CLINICAL IMPRESSION  1.  Chest pain, unspecified type           Susan Martinez PA-C  04/29/22 [id number]

## 2022-04-30 NOTE — ED PROVIDER NOTES
I have reviewed the below EKG. I was not otherwise involved in this patient's care. EKG  The Ekg interpreted by myself  sinus tachycardia, uqxz=[id number] with a rate of 103  Axis is   Normal  QTc is  normal  Intervals and Durations are unremarkable. No specific ST-T wave changes appreciated. No evidence of acute ischemia. Sinus tachycardia has replaced normal sinus rhythm when compared to the EKG from December 9, 2019.         Rut Simpson MD  04/29/22 [id number]

## 2022-05-29 ENCOUNTER — HOSPITAL ENCOUNTER (EMERGENCY)
Age: 32
Discharge: HOME | End: 2022-05-29
Payer: MEDICARE

## 2022-05-29 VITALS
TEMPERATURE: 97.5 F | HEART RATE: 83 BPM | DIASTOLIC BLOOD PRESSURE: 80 MMHG | OXYGEN SATURATION: 96 % | HEIGHT: 65 IN | WEIGHT: 225 LBS | SYSTOLIC BLOOD PRESSURE: 123 MMHG | RESPIRATION RATE: 16 BRPM | BODY MASS INDEX: 37.49 KG/M2

## 2022-05-29 DIAGNOSIS — Z86.59 HISTORY OF BEHAVIORAL AND MENTAL HEALTH PROBLEMS: Primary | ICD-10-CM

## 2022-05-29 PROCEDURE — 99282 EMERGENCY DEPT VISIT SF MDM: CPT

## 2022-05-29 ASSESSMENT — PAIN - FUNCTIONAL ASSESSMENT: PAIN_FUNCTIONAL_ASSESSMENT: NONE - DENIES PAIN

## 2022-05-29 ASSESSMENT — ENCOUNTER SYMPTOMS
VOMITING: 0
ABDOMINAL PAIN: 0
BACK PAIN: 0
DIARRHEA: 0
EYE PAIN: 0
CONSTIPATION: 0
SHORTNESS OF BREATH: 0
SORE THROAT: 0
RHINORRHEA: 0
NAUSEA: 0
COUGH: 0

## 2022-05-30 NOTE — ED NOTES
Patient discharged in stable, ambulatory condition with all documented belongings accompanied by mother. This nurse reviewed discharge instructions, pt verbalized understanding.        Carol Galaviz RN  05/29/22 [id number]

## 2022-05-30 NOTE — ED PROVIDER NOTES
[address] ED  EMERGENCY DEPARTMENT ENCOUNTER        Pt Name: Sapna Delgado  MRN: [id number]  [place] 1990  Date of evaluation: 5/29/2022  Provider: DONAVON Jones - CNP  PCP: Isis Devine MD    This patient was not seen and evaluated by the attending physician. I have evaluated this patient. My supervising physician was available for consultation. CHIEF COMPLAINT       Chief Complaint   Patient presents with    Depression     patient reports worsening depression, denies thoughts of SI/HI, thinks that he needs an adjustment to his medications. HISTORY OF PRESENT ILLNESS   (Location/Symptom, Timing/Onset, Context/Setting, Quality, Duration, Modifying Factors, Severity)  Note limiting factors. Sapna Delgado is a 28 y.o. male who presents via private car for  depression. Onset was childhood. Duration has been intermittent since the onset. Context includes patient presents with care giver stating he has a history of depression and he feels like his medications are needing adjustment. He denies suicidal or homicidal ideation and denies recent illness including fever, chest pain, shortness of breath or abdominal pain. His caregiver states she has not spoken with the patients therapist or psychiatrist regarding their request to change medications. She states the patient has been calm and cooperative at home, denies that he has had outbursts of anger or that she feels unsafe with the patient living with her. Quality is nothing  with radiation to nothing. Alleviating factors include nothing. Aggravating factors include nothing. Pain is 0/10. nothing has been used for pain today. Chart review reveals pt has significant PMHx of developmental delay, acid reflux, ADHD, bipolar affective, hyperlipidemia, obsessive-compulsive disorder, schizoaffective schizophrenia, seizures.  They take fluticasone, atorvastatin, diazepam, co-Q10, Wellbutrin, minocycline, ibuprofen, Abilify, Prozac, Adderall, trazodone. Nursing Notes were all reviewed and agreed with or any disagreements were addressed  in the HPI. Pt was seen during the L[place] Pent[place] 19 pandemic. Appropriate PPE worn by ME during patient encounters. Pt seen during a time with constrained hospital bed capacity and other potential inpatient and outpatient resources were constrained due to the viral pandemic. REVIEW OF SYSTEMS    (2-9 systems for level 4, 10 or more for level 5)     Review of Systems   Constitutional: Negative for chills, diaphoresis and fever. HENT: Negative for congestion, rhinorrhea and sore throat. Eyes: Negative for pain and visual disturbance. Respiratory: Negative for cough and shortness of breath. Cardiovascular: Negative for chest pain and leg swelling. Gastrointestinal: Negative for abdominal pain, constipation, diarrhea, nausea and vomiting. Genitourinary: Negative for dysuria, frequency and urgency. Musculoskeletal: Negative for back pain and neck pain. Skin: Negative for rash and wound. Neurological: Negative for dizziness and light-headedness. Psychiatric/Behavioral: Negative for agitation, behavioral problems, confusion, decreased concentration, dysphoric mood, hallucinations, self-injury, sleep disturbance and suicidal ideas. The patient is not nervous/anxious and is not hyperactive. Positives and Pertinent negatives as per HPI. Except as noted abovein the ROS, all other systems were reviewed and negative.        PAST MEDICAL HISTORY     Past Medical History:   Diagnosis Date    Acid reflux     ADHD     Bipolar affect, depressed ([address]     Hyperlipidemia     Obesity     OCD (obsessive compulsive disorder)     [facility] affective [facility]     Seizures ([facility]          SURGICAL HISTORY     Past Surgical History:   Procedure Laterality Date    TOOTH EXTRACTION      UPPER GASTROINTESTINAL ENDOSCOPY           [address] Discharge Medication List as of 5/29/2022 11:01 PM      CONTINUE these medications which have NOT CHANGED    Details   fluticasone (ARNUITY ELLIPTA) 200 MCG/ACT AEPB Inhale 1 puff into the lungs daily, Disp-1 each, R-3Normal      nystatin (MYCOSTATIN) 028802 UNIT/GM cream Apply topically 2 times daily. , Disp-60 g, R-3, Normal      albuterol sulfate HFA (VENTOLIN HFA) 108 (90 Base) MCG/ACT inhaler Inhale 2 puffs into the lungs every 6 hours as needed for Wheezing or Shortness of Breath, Disp-1 Inhaler, R-5May change to any albuterol inhaler covered by insuranceNormal      atorvastatin (LIPITOR) 10 MG tablet TAKE 1 TABLET BY MOUTH EVERY DAY, Disp-90 tablet, R-3Normal      ondansetron (ZOFRAN) 4 MG tablet Take 1 tablet by mouth every 8 hours as needed for Nausea, Disp-10 tablet, R-0Print      diazepam (VALIUM) 5 MG tablet Take 5 mg by mouth every 6 hours as needed for Anxiety. Historical Med      Co-Enzyme Q10 200 MG CAPS Take 200 mg by mouthHistorical Med      buPROPion (WELLBUTRIN XL) 300 MG extended release tablet TAKE 1 TABLET BY MOUTH IN THE MORNING, R-0Historical Med      minocycline (MINOCIN;DYNACIN) 100 MG capsule TAKE 1 CAPSULE EVERY DAY, R-0Historical Med      ibuprofen (ADVIL;MOTRIN) 400 MG tablet Take 1 tablet by mouth every 6 hours as needed for Pain, Disp-120 tablet, R-3Normal      !! ARIPiprazole (ABILIFY) 15 MG tablet Take 15 mg by mouth daily Take PRN in addition to the 30 mg tabletHistorical Med      FLUoxetine (PROZAC) 20 MG capsule Take 20 mg by mouth daily, R-2Historical Med      amphetamine-dextroamphetamine (ADDERALL, 30MG,) 30 MG tablet Take 30 mg by mouth daily  Ordered by Dr. Cheryl Donohue  In 12/2016 . traZODone (DESYREL) 100 MG tablet Take 100 mg by mouth nightly      !! ARIPiprazole (ABILIFY) 30 MG tablet Take 30 mg by mouth daily. docusate sodium (COLACE) 100 MG capsule 2 at hs, Disp-60 capsule, R-11       !! - Potential duplicate medications found. Please discuss with provider. ALLERGIES     Molds & smuts    FAMILYHISTORY       Family History   Adopted: Yes   Problem Relation Age of Onset    Mental Illness Mother     Substance Abuse Mother     Mental Illness Father     Substance Abuse Father           SOCIAL HISTORY       Social History     Socioeconomic History    Marital status: Single     Spouse name: None    Number of children: 0    Years of education: 15    Highest education level: None   Occupational History    Occupation: unemployed   Tobacco Use    Smoking status: Current Every Day Smoker     Packs/day: 0.50     Years: 4.00     Pack years: 2.00     Types: Cigarettes     Start date: 2018    Smokeless tobacco: Former User   Vaping Use    Vaping Use: Some days    Substances: Never   Substance and Sexual Activity    Alcohol use: Yes     Alcohol/week: 0.0 standard drinks    Drug use: No    Sexual activity: Not Currently     Partners: Female   Other Topics Concern    None   Social History Narrative    None     Social Determinants of Health     Financial Resource Strain: Low Risk     Difficulty of Paying Living Expenses: Not hard at all   Food Insecurity: No Food Insecurity    Worried About Running Out of Food in the Last Year: Never true    Phi of Food in the Last Year: Never true   Transportation Needs:     Lack of Transportation (Medical): Not on file    Lack of Transportation (Non-Medical):  Not on file   Physical Activity:     Days of Exercise per Week: Not on file    Minutes of Exercise per Session: Not on file   Stress:     Feeling of Stress : Not on file   Social Connections:     Frequency of Communication with Friends and Family: Not on file    Frequency of Social Gatherings with Friends and Family: Not on file    Attends Quaker Services: Not on file    Active Member of Clubs or Organizations: Not on file    Attends Club or Organization Meetings: Not on file    Marital Status: Not on file   Intimate Partner Violence:     Fear of Current or Ex-Partner: Not on file    Emotionally Abused: Not on file    Physically Abused: Not on file    Sexually Abused: Not on file   Housing Stability:     Unable to Pay for Housing in the Last Year: Not on file    Number of [place] in the Last Year: Not on file    Unstable Housing in the Last Year: Not on file       SCREENINGS    Mack Coma Scale  Eye Opening: Spontaneous  Best Verbal Response: Oriented  Best Motor Response: Obeys commands  Mack Coma Scale Score: 15        PHYSICAL EXAM    (up to 7 for level 4, 8 or more for level 5)     ED Triage Vitals [05/29/22 2050]   BP Temp Temp Source Heart Rate Resp SpO2 Height Weight   (!) 137/94 97.5 °F (36.4 °C) Tympanic 87 16 97 % 5' 5\" (1.651 m) 225 lb (102.1 kg)       Physical Exam  Vitals and nursing note reviewed. Constitutional:       Appearance: Normal appearance. He is not ill-appearing or diaphoretic. HENT:      Head: Normocephalic and atraumatic. Right Ear: External ear normal.      Left Ear: External ear normal.   Eyes:      General:         Right eye: No discharge. Left eye: No discharge. Cardiovascular:      Rate and Rhythm: Normal rate and regular rhythm. Pulses: Normal pulses. Heart sounds: Normal heart sounds. No murmur heard. No friction rub. No gallop. Pulmonary:      Effort: Pulmonary effort is normal. No respiratory distress. Breath sounds: Normal breath sounds. No stridor. No wheezing, rhonchi or rales. Abdominal:      Palpations: Abdomen is soft. Musculoskeletal:         General: Normal range of motion. Cervical back: Normal range of motion and neck supple. Skin:     General: Skin is warm and dry. Capillary Refill: Capillary refill takes less than 2 seconds. Neurological:      General: No focal deficit present. Mental Status: He is alert and oriented to person, place, and time.    Psychiatric:         Attention and Perception: Attention and perception normal. He is attentive. He does not perceive auditory or visual hallucinations. Mood and Affect: Mood is depressed. Mood is not anxious or elated. Affect is not labile, blunt, flat, angry, tearful or inappropriate. Speech: Speech normal.         Behavior: Behavior normal.         Thought Content: Thought content normal.         Cognition and Memory: Cognition and memory normal.         Judgment: Judgment normal.       PHYSICAL EXAM  /80   Pulse 83   Temp 97.5 °F (36.4 °C) (Tympanic)   Resp 16   Ht 5' 5\" (1.651 m)   Wt 225 lb (102.1 kg)   SpO2 96%   BMI 37.44 kg/m²       DIAGNOSTIC RESULTS   LABS:    Labs Reviewed - No data to display    All other labs were within normal range or not returned as of this dictation. EKG: All EKG's are interpreted by the Emergency Department Physician who either signs orCo-signs this chart in the absence of a cardiologist.  Please see their note for interpretation of EKG. RADIOLOGY:   Non-plain film images such as CT, Ultrasound and MRI are read by the radiologist. Plain radiographic images are visualized andpreliminarily interpreted by the  ED Provider with the below findings:        Interpretation perthe Radiologist below, if available at the time of this note:    No orders to display     No results found.        PROCEDURES   Unless otherwise noted below, none     Procedures    CRITICAL CARE TIME   N/A    CONSULTS:  None      EMERGENCY DEPARTMENT COURSE and DIFFERENTIALDIAGNOSIS/MDM:   Vitals:    Vitals:    05/29/22 2050 05/29/22 2300   BP: (!) 137/94 123/80   Pulse: 87 83   Resp: 16 16   Temp: 97.5 °F (36.4 °C)    TempSrc: Tympanic    SpO2: 97% 96%   Weight: 225 lb (102.1 kg)    Height: 5' 5\" (1.651 m)        Patient was given thefollowing medications:  Medications - No data to display    PDMP Monitoring:    Last PDMP Floyd Juarez as Reviewed [facility]):  Review User Review Instant Review Result            Urine Drug Screenings (1 yr)     Drug screen multi urine  Collected: 3/27/2018 10:40 AM (Final result)   Narrative: Performed at:  [facility]) [facility]  [address],  [place], [place]   Phone [phone]     Complete Results          Drug screen multi urine  Collected: 4/28/2015  6:13 PM (Final result)    Complete Results          Drug screen multi urine  Collected: 5/20/2014  7:30 PM (Final result)    Complete Results          Urine Drug Screen  Collected: 1/28/2014  8:30 PM (Final result)    Complete Results          Drug screen multi urine  Collected: 4/1/2012  8:45 PM (Final result)   Narrative: This method is a screening test to detect only these drug classes as  part of a medical workup. Confirmatory testing by another method should  be ordered if clinically indicated. Complete Results              Medication Contract and Consent for Opioid Use Documents Filed      No documents found                MDM:   This patient was seen and evaluated by myself. He presents to the emergency department this evening for evaluation of depression and medication management. He presents with his guardian, the patient does have a developmental delay. On exam the patient is alert and oriented, hemodynamically stable and nontoxic in appearance. He denies any current suicidal or homicidal ideation. He is currently taking Abilify, Wellbutrin, Prozac as well as as needed Valium as needed for depression. The patient and his caregiver state that he has been taking his medications as prescribed. His caregiver states they do have good follow-up with his psychiatrist and have not discussed with him a plan for changing his medications for his feelings of increasing depression. The caregiver states the patient has been calm and cooperative at home and states he has not had any aggressive outburst and she does not feel unsafe.   I discussed the plan of care with behavioral health who provided the patient and his caregiver with outpatient resources including follow-up for therapy as well as crisis hotline numbers for their area and a list of physicians in the event they would want to establish care with a new psychiatrist. The family declined a need to speak with the behavioral health team and stated they would prefer to discuss any changes to the patients medications with his PCP or active psychiatrist. The caregiver states the patient was most recently seen by his Psychiatrist in February and had a follow up appointment scheduled for August.  I had a long conversation with the patient and his caregiver regarding disposition of discharge. Both the patient and his guardian felt comfortable with this plan as the patient was active and engaging in conversation in the emergency department, making eye contact and was participating in development of his plan of care. The patient continued to deny any suicidal or homicidal ideations, he was not having any active delusions or hallucinations during his evaluation. The caregiver stated that she would call the patient's primary care provider as well as his psychiatrist on Tuesday to discuss a plan for possible evaluation and management with changes to his medications. The caregiver stated they would utilize the resources provided by behavioral health. The patient had no health complaints in the emergency department including denying chest pain, shortness of breath, abdominal pain or other concerning symptoms. I did discuss with them a plan for discharge and provided them with strict follow-up precautions for the emergency department including but not limited to onset of suicidal or homicidal ideation, increasing in aggressive behavior or increasing thoughts of depression or anxiety. The patient and his family verbalized understanding of all discharge teaching and they were ultimately discharged with mental health resources in a stable condition with all questions answered.           Is this patient to be included in the SEP-1 Core Measure due to severe sepsis or septic shock? No   Exclusion criteria - the patient is NOT to be included for SEP-1 Core Measure due to: Infection is not suspected    Discharge Time out:  CC Reviewed Yes   Test Results Yes     Vitals:    05/29/22 2300   BP: 123/80   Pulse: 83   Resp: 16   Temp:    SpO2: 96%              FINAL IMPRESSION      1. History of behavioral and mental health problems          DISPOSITION/PLAN   DISPOSITION Decision To Discharge 05/29/2022 10:49:33 PM      PATIENT REFERREDTO:  Bogdan Powell MD  [address] [place] [address].  [facility] [address]  [phone]      As needed, If symptoms worsen    [address]-M ED  [address] [place] [address]  [phone]    As needed, If symptoms worsen      DISCHARGE MEDICATIONS:  Discharge Medication List as of 5/29/2022 11:01 PM          DISCONTINUED MEDICATIONS:  Discharge Medication List as of 5/29/2022 11:01 PM                 (Please note that portions ofthis note were completed with a voice recognition program.  Efforts were made to edit the dictations but occasionally words are mis-transcribed.)    DONAVON Harding CNP (electronically signed)       DONAVON Haridng CNP  05/29/22 9301

## 2022-07-02 DIAGNOSIS — E78.00 PURE HYPERCHOLESTEROLEMIA: ICD-10-CM

## 2022-07-04 RX ORDER — ATORVASTATIN CALCIUM 10 MG/1
TABLET, FILM COATED ORAL
Qty: 90 TABLET | Refills: 3 | Status: SHIPPED | OUTPATIENT
Start: 2022-07-04

## 2022-09-28 ENCOUNTER — OFFICE VISIT (OUTPATIENT)
Dept: PULMONOLOGY | Age: 32
End: 2022-09-28
Payer: MEDICARE

## 2022-09-28 ENCOUNTER — TELEPHONE (OUTPATIENT)
Dept: FAMILY MEDICINE CLINIC | Age: 32
End: 2022-09-28

## 2022-09-28 VITALS
WEIGHT: 226 LBS | RESPIRATION RATE: 20 BRPM | BODY MASS INDEX: 37.65 KG/M2 | HEIGHT: 65 IN | OXYGEN SATURATION: 98 % | HEART RATE: 86 BPM | DIASTOLIC BLOOD PRESSURE: 76 MMHG | SYSTOLIC BLOOD PRESSURE: 111 MMHG | TEMPERATURE: 98.7 F

## 2022-09-28 DIAGNOSIS — R06.02 SHORTNESS OF BREATH: ICD-10-CM

## 2022-09-28 DIAGNOSIS — Z87.891 HISTORY OF TOBACCO ABUSE: ICD-10-CM

## 2022-09-28 DIAGNOSIS — G47.33 OBSTRUCTIVE SLEEP APNEA: Primary | ICD-10-CM

## 2022-09-28 DIAGNOSIS — R91.1 LUNG NODULE: ICD-10-CM

## 2022-09-28 DIAGNOSIS — E66.9 CLASS 2 OBESITY WITHOUT SERIOUS COMORBIDITY WITH BODY MASS INDEX (BMI) OF 36.0 TO 36.9 IN ADULT, UNSPECIFIED OBESITY TYPE: ICD-10-CM

## 2022-09-28 DIAGNOSIS — E66.01 SEVERE OBESITY (BMI 35.0-39.9) WITH COMORBIDITY (HCC): ICD-10-CM

## 2022-09-28 PROCEDURE — G8417 CALC BMI ABV UP PARAM F/U: HCPCS | Performed by: INTERNAL MEDICINE

## 2022-09-28 PROCEDURE — 99214 OFFICE O/P EST MOD 30 MIN: CPT | Performed by: INTERNAL MEDICINE

## 2022-09-28 PROCEDURE — 4004F PT TOBACCO SCREEN RCVD TLK: CPT | Performed by: INTERNAL MEDICINE

## 2022-09-28 PROCEDURE — G8427 DOCREV CUR MEDS BY ELIG CLIN: HCPCS | Performed by: INTERNAL MEDICINE

## 2022-09-28 ASSESSMENT — ENCOUNTER SYMPTOMS
EYES NEGATIVE: 1
ORTHOPNEA: 0
WHEEZING: 0
SHORTNESS OF BREATH: 1
SORE THROAT: 0
ALLERGIC/IMMUNOLOGIC NEGATIVE: 1
SWOLLEN GLANDS: 0
HEMOPTYSIS: 0
VOMITING: 0
GASTROINTESTINAL NEGATIVE: 1
RHINORRHEA: 0
ABDOMINAL PAIN: 0
SPUTUM PRODUCTION: 0

## 2022-09-28 NOTE — TELEPHONE ENCOUNTER
Patient called and informed that results are not in yet for his CT of chest. Informed that dr. Franci Modi office will call him about the results

## 2022-09-28 NOTE — TELEPHONE ENCOUNTER
----- Message from Pramod Pablo sent at 9/28/2022  2:26 PM EDT -----  Subject: Referral Request    Reason for referral request? Lung nodule  Provider patient wants to be referred to(if known):     Provider Phone Number(if known): Additional Information for Provider?  Patient would like if office staff   could give call back with questions and concerns he has  ---------------------------------------------------------------------------  --------------  [phone]    [phone]; OK to leave message on voicemail  ---------------------------------------------------------------------------  --------------

## 2022-09-28 NOTE — PATIENT INSTRUCTIONS
ASSESSMENT/PLAN:  1. Lung nodule  2. Shortness of breath  3. Class 2 obesity without serious comorbidity with body mass index (BMI) of 36.0 to 36.9 in adult, unspecified obesity type  4. Tobacco abuse  5. Obstructive sleep apnea  6. Severe obesity (BMI 35.0-39. 9) with comorbidity ([address]      Lung nodule  Chest x-ray done 8/13/2021  Impression   1. No acute airspace consolidation. 2. 4 mm nodular opacity overlying the midthoracic spine on the lateral view,   possibly a pulmonary nodule. Recommend further characterization with a chest   CT, preferably with contrast.   The findings were sent to the Radiology Results [address] at 12:38   pm on 8/13/2021to be communicated to a licensed caregiver. CT of the chest on 8/20/2021  Impression   No pulmonary nodule. Findings on the recent chest x-ray likely due to   superimposition artifact of vasculature simulating a nodule. No evidence of acute cardiopulmonary process. 1.1 cm indeterminate left adrenal nodule. This is most likely an incidental   adenoma but cannot be diagnosed as such on this examination. Recommendation   for this scenario is 1 year follow-up adrenal washout pre/post contrast CT,   to be performed 1 year now, assuming there is no history of cancer. If there   is history of cancer than a metastatic lesion would be a concern and   whole-body PET-CT would be recommended           No lung nodule as per the Ct scan  However does have adrenal nodule  Agree with ct scan in one year. Will need to get ct scan now         Tobacco use  Quit smoking 2 weeks   20 pky    RECOMMENDATIONS:         Advised to avoid driving when too sleepy to function safely and given a discussion of the risks of untreated apnea such as accidents, cognitive impairment, mood impairment, high blood pressure, various cardiac diseases and stroke. Weight loss was encouraged.        ESS is 12/24  Neck size is 17 inches  Would recommend sleep study  Will do this at home          Shortness of breath    DATE OF PROCEDURE:  11/05/2021     PFT AND METHACHOLINE CHALLENGE. The first is a spirometry, FEV1 of 3.58, 92% predicted; FVC of 4.46, 95%  predicted, FEV1 to FVC ratio of 80% showing no obstructive lung defect. Lung volumes do show some mild restrictive lung defect. There is no air  trapping or hyperinflation. Diffusion is normal.     IMPRESSION:  Normal spirometry. Possible mild restrictive lung defect  with no normal diffusion. Flow volume loops are consistent with the  diagnosis. Methacholine Challenge:  After two doses, there was a positive response  to methacholine with a drop of more than 20%. IMPRESSION:  Positive methacholine challenge. Continue with:  Albuterol  2 puffs as needed two times a week    Need to start using this every day  Arnuity 200  Mcg  1 puff a day    RTC in 3 months    Remember to bring a list of pulmonary medications and any CPAP or BiPAP machines to your next appointment with the office. Please keep all of your future appointments scheduled by [facility] office. Out of respect for other patients and providers, you may be asked to reschedule your appointment if you arrive later than your scheduled appointment time. Appointments cancelled less than 24hrs in advance will be considered a no show. Patients with three missed appointments within 1 year or four missed appointments within 2 years can be dismissed from the practice. Please be aware that our physicians are required to work in the Intensive Care Unit at [facility].  Your appointment may need to be rescheduled if they are designated to work during your appointment time. You may receive a survey regarding the care you received during your visit. Your input is valuable to us. We encourage you to complete and return your survey. We hope you will choose us in the future for your healthcare needs. Pt instructed of all future appointment dates & times, including radiology, labs, procedures & referrals. If procedures were scheduled preparation instructions provided. Instructions on future appointments with [facility] were given.

## 2022-09-28 NOTE — LETTER
[address] [facility]  [address] [facility] [address] [address]  Phone: [phone]  Fax: [phone]    Sierra Garces MD        September 28, 2022     Patient: Dada Cyr   YOB: 1990   Date of Visit: 9/28/2022 9/28/22        Dada Cyr      I have seen this patient in the office today and wanted to communicate my findings and recommendations. Patient Instructions       ASSESSMENT/PLAN:  1. Lung nodule  2. Shortness of breath  3. Class 2 obesity without serious comorbidity with body mass index (BMI) of 36.0 to 36.9 in adult, unspecified obesity type  4. Tobacco abuse  5. Obstructive sleep apnea  6. Severe obesity (BMI 35.0-39. 9) with comorbidity ([address]      Lung nodule  Chest x-ray done 8/13/2021  Impression   1. No acute airspace consolidation. 2. 4 mm nodular opacity overlying the midthoracic spine on the lateral view,   possibly a pulmonary nodule. Recommend further characterization with a chest   CT, preferably with contrast.   The findings were sent to the Radiology Results [address] at 12:38   pm on 8/13/2021to be communicated to a licensed caregiver. CT of the chest on 8/20/2021  Impression   No pulmonary nodule. Findings on the recent chest x-ray likely due to   superimposition artifact of vasculature simulating a nodule. No evidence of acute cardiopulmonary process. 1.1 cm indeterminate left adrenal nodule. This is most likely an incidental   adenoma but cannot be diagnosed as such on this examination. Recommendation   for this scenario is 1 year follow-up adrenal washout pre/post contrast CT,   to be performed 1 year now, assuming there is no history of cancer.   If there   is history of cancer than a metastatic lesion would be a concern and   whole-body PET-CT would be recommended           No lung nodule as per the Ct scan  However does have adrenal nodule  Agree with ct scan in one year. Will need to get ct scan now         Tobacco use  Quit smoking 2 weeks   20 pky    RECOMMENDATIONS:         Advised to avoid driving when too sleepy to function safely and given a discussion of the risks of untreated apnea such as accidents, cognitive impairment, mood impairment, high blood pressure, various cardiac diseases and stroke. Weight loss was encouraged. ESS is 12/24  Neck size is 17 inches  Would recommend sleep study  Will do this at home          Shortness of breath    DATE OF PROCEDURE:  11/05/2021     PFT AND METHACHOLINE CHALLENGE. The first is a spirometry, FEV1 of 3.58, 92% predicted; FVC of 4.46, 95%  predicted, FEV1 to FVC ratio of 80% showing no obstructive lung defect. Lung volumes do show some mild restrictive lung defect. There is no air  trapping or hyperinflation. Diffusion is normal.     IMPRESSION:  Normal spirometry. Possible mild restrictive lung defect  with no normal diffusion. Flow volume loops are consistent with the  diagnosis. Methacholine Challenge:  After two doses, there was a positive response  to methacholine with a drop of more than 20%. IMPRESSION:  Positive methacholine challenge.          Continue with:  Albuterol  2 puffs as needed two times a week    Need to start using this every day  Arnuity 200  Mcg  1 puff a day    RTC in 3 months                   Thank you for allowing me to assist in the care of the MD Gamal Adler MD

## 2022-09-28 NOTE — PROGRESS NOTES
Rickey Parikh (:  1990) is a 28 y.o. male,New patient, here for evaluation of the following chief complaint(s):  Shortness of Breath         ASSESSMENT/PLAN:  1. Lung nodule  2. Shortness of breath  3. Class 2 obesity without serious comorbidity with body mass index (BMI) of 36.0 to 36.9 in adult, unspecified obesity type  4. Tobacco abuse  5. Obstructive sleep apnea  6. Severe obesity (BMI 35.0-39. 9) with comorbidity ([address]      Lung nodule  Chest x-ray done 2021  Impression   1. No acute airspace consolidation. 2. 4 mm nodular opacity overlying the midthoracic spine on the lateral view,   possibly a pulmonary nodule. Recommend further characterization with a chest   CT, preferably with contrast.   The findings were sent to the Radiology Results [address] at 12:38   pm on 2021to be communicated to a licensed caregiver. CT of the chest on 2021  Impression   No pulmonary nodule. Findings on the recent chest x-ray likely due to   superimposition artifact of vasculature simulating a nodule. No evidence of acute cardiopulmonary process. 1.1 cm indeterminate left adrenal nodule. This is most likely an incidental   adenoma but cannot be diagnosed as such on this examination. Recommendation   for this scenario is 1 year follow-up adrenal washout pre/post contrast CT,   to be performed 1 year now, assuming there is no history of cancer. If there   is history of cancer than a metastatic lesion would be a concern and   whole-body PET-CT would be recommended           No lung nodule as per the Ct scan  However does have adrenal nodule  Agree with ct scan in one year.     Will need to get ct scan now         Tobacco use  Quit smoking 2 weeks   20 pky    RECOMMENDATIONS:         Advised to avoid driving when too sleepy to function safely and given a discussion of the risks of untreated apnea such as accidents, cognitive impairment, mood impairment, high blood pressure, various cardiac diseases and stroke. Weight loss was encouraged. ESS is 12/24  Neck size is 17 inches  Would recommend sleep study  Will do this at home          Shortness of breath    DATE OF PROCEDURE:  11/05/2021     PFT AND METHACHOLINE CHALLENGE. The first is a spirometry, FEV1 of 3.58, 92% predicted; FVC of 4.46, 95%  predicted, FEV1 to FVC ratio of 80% showing no obstructive lung defect. Lung volumes do show some mild restrictive lung defect. There is no air  trapping or hyperinflation. Diffusion is normal.     IMPRESSION:  Normal spirometry. Possible mild restrictive lung defect  with no normal diffusion. Flow volume loops are consistent with the  diagnosis. Methacholine Challenge:  After two doses, there was a positive response  to methacholine with a drop of more than 20%. IMPRESSION:  Positive methacholine challenge. Continue with:  Albuterol  2 puffs as needed two times a week    Need to start using this every day  Arnuity 200  Mcg  1 puff a day    RTC in 3 months      No follow-ups on file. .I have personally reviewed and summarized the old records         I have independently reviewed the images and reviewed with patient    I have reviewed the lab tests, radiology reports and medications      Reviewed present meds and side effects. Continue present meds. Stay compliant. Call if worsens. Reviewed proper inhaler usage      Subjective   SUBJECTIVE/OBJECTIVE:  Consult from Claude Riedel, APRN  For lung nodule  Initially seen by me on 8/27/2021          Had cxr done b/c of sob  But smoking 1/2 ppd   For about 3-4 years      Sob for the past 3-4 weeks    With laying down will get sob  And getting up    Lost weight, not affecting      Some sob and cough in the middle of night    + snore  + dry mouth    No headache  No nocturia  Toss and turn  Some sleepiness, more as per mom      Subjective:              35old year old,male, with PMH significant for obesity,  that presents today for initial evaluation. Pt reports snoring for several years  and that it is getting same. Pt reports does snore moderate for years. Pt's  does wake   himself with drymouth, snoring, sweating, sore throat, . Pt does report fatigue or tiredness frequently. Pt sleeps more than 7 hours, and at time overwhelming sleepiness attacks. Pt  does dozes unintentionally while watching TV. While driving  Does not feel drowsy / nod off / fall sleep at stop lights. Pt does not have h/o sleepiness associated wrecks/near wrecks. Pt does  nod off while  unattended. Pt does not report having restless legs 0 times a week. This is often accompanied by leg jerks during sleep, numbness in legs or feet, aches/burning/cramps in legs, feet. Does  report having nasal congestion. Does not for use of nasal sprays, nose or sinus surgery. Does not for broken nose, tonsillectomy, sleeping w/ chest raised. Does not sleep with oxygen. Pt does not have a dental appliance or braces on teeth. Does not teeth grinding. Does not report nightmares, sleep walking, dreaming during naps. When angry or laughing Anamaria Higuera does not  report cataplexy. he does not report hallucinations when dozing off or immediately upon awakening. Does not report sleep paralysis. Did  have parasomnia as child. Patient's Blessing Sleepiness score  is required. Patient  is CONSISTENT with moderate daytime sleepiness. Since last visit  Using albuterol  1-2 times a day      Still smoking  didn' t do the sleep study            [facility]  adopted      Shortness of Breath  This is a new problem. The current episode started more than 1 month ago. The problem occurs intermittently. The problem has been waxing and waning.  Pertinent negatives include no abdominal pain, chest pain, claudication, coryza, ear pain, fever, headaches, hemoptysis, leg pain, leg swelling, neck pain, orthopnea, PND, rash, rhinorrhea, sore throat, sputum production, swollen glands, syncope, vomiting or wheezing. Review of Systems   Constitutional: Negative. Negative for fever. HENT: Negative. Negative for ear pain, rhinorrhea and sore throat. Eyes: Negative. Respiratory:  Positive for shortness of breath. Negative for hemoptysis, sputum production and wheezing. Cardiovascular: Negative. Negative for chest pain, orthopnea, claudication, leg swelling, syncope and PND. Gastrointestinal: Negative. Negative for abdominal pain and vomiting. Endocrine: Negative. Genitourinary: Negative. Musculoskeletal: Negative. Negative for neck pain. Skin: Negative. Negative for rash. Allergic/Immunologic: Negative. Neurological: Negative. Negative for headaches. Hematological: Negative. Psychiatric/Behavioral: Negative. Vitals:    09/28/22 1333   BP: 111/76   Site: Left Upper Arm   Position: Sitting   Cuff Size: Large Adult   Pulse: 86   Resp: 20   Temp: 98.7 °F (37.1 °C)   TempSrc: Temporal   SpO2: 98%   Weight: 226 lb (102.5 kg)   Height: 5' 5\" (1.651 m)       Objective   Physical Exam  Vitals and nursing note reviewed. Constitutional:       General: He is not in acute distress. Appearance: Normal appearance. He is not ill-appearing. HENT:      Head: Normocephalic and atraumatic. Right Ear: External ear normal.      Left Ear: External ear normal.      Nose: Nose normal.      Mouth/Throat:      Mouth: Mucous membranes are moist.      Pharynx: Oropharynx is clear. Comments: Mallampati 3  Eyes:      General: No scleral icterus. Extraocular Movements: Extraocular movements intact. Conjunctiva/sclera: Conjunctivae normal.      Pupils: Pupils are equal, round, and reactive to light. Cardiovascular:      Rate and Rhythm: Normal rate and regular rhythm. Pulses: Normal pulses. Heart sounds: Normal heart sounds. No murmur heard. No friction rub.    Pulmonary:      Effort: Pulmonary effort is normal. No respiratory distress. Breath sounds: Normal breath sounds. No stridor. No wheezing, rhonchi or rales. Chest:      Chest wall: No tenderness. Abdominal:      General: Abdomen is flat. Bowel sounds are normal. There is no distension. Tenderness: There is no abdominal tenderness. There is no guarding. Musculoskeletal:         General: No swelling or tenderness. Normal range of motion. Cervical back: Normal range of motion and neck supple. No rigidity. Skin:     General: Skin is warm and dry. Coloration: Skin is not jaundiced. Neurological:      General: No focal deficit present. Mental Status: He is alert and oriented to person, place, and time. Mental status is at baseline. Cranial Nerves: No cranial nerve deficit. Sensory: No sensory deficit. Motor: No weakness. Gait: Gait normal.   Psychiatric:         Mood and Affect: Mood normal.         Thought Content: Thought content normal.         Judgment: Judgment normal.              Narrative   EXAMINATION:   CT OF THE CHEST WITH CONTRAST 8/20/2021 3:17 pm       TECHNIQUE:   CT of the chest was performed with the administration of intravenous   contrast. Multiplanar reformatted images are provided for review. Dose   modulation, iterative reconstruction, and/or weight based adjustment of the   mA/kV was utilized to reduce the radiation dose to as low as reasonably   achievable. COMPARISON:   Chest x-ray August 13, 2021       HISTORY:   ORDERING SYSTEM PROVIDED HISTORY: Abnormal chest x-ray   TECHNOLOGIST PROVIDED HISTORY:   Reason for exam:->abnormal chest xray, showing a lung nodule   Reason for Exam: recent cxr showed lung nodule-c/o sob x 2 months   Acuity: Acute   Type of Exam: Initial   Additional signs and symptoms: former smoker x 4 yrs       FINDINGS:   Mediastinum: Normal size lymph nodes. No adenopathy. No acute findings. No   pericardial effusion.        Lungs/pleura: Lungs are clear.  No pulmonary nodule in the area of concern   based on the recent chest x-ray or elsewhere. No pneumonia or pulmonary   edema. No pneumothorax or pleural effusion. Upper Abdomen: There is a 1.1 cm left adrenal nodule. Hounsfield unit value   of 30.23. No abnormality elsewhere. Soft Tissues/Bones: No significant osseous findings. Bilateral gynecomastia   noted incidentally. Normal size lymph nodes. Impression   No pulmonary nodule. Findings on the recent chest x-ray likely due to   superimposition artifact of vasculature simulating a nodule. No evidence of acute cardiopulmonary process. 1.1 cm indeterminate left adrenal nodule. This is most likely an incidental   adenoma but cannot be diagnosed as such on this examination. Recommendation   for this scenario is 1 year follow-up adrenal washout pre/post contrast CT,   to be performed 1 year now, assuming there is no history of cancer. If there   is history of cancer than a metastatic lesion would be a concern and   whole-body PET-CT would be recommended       Narrative   EXAMINATION:   TWO XRAY VIEWS OF THE CHEST       8/13/2021 11:53 am       COMPARISON:   12/09/2019       HISTORY:   ORDERING SYSTEM PROVIDED HISTORY: Shortness of breath   TECHNOLOGIST PROVIDED HISTORY:   Reason for exam:->Shortness of breath   Reason for Exam: SOB, cough   Acuity: Acute   Type of Exam: Initial       FINDINGS:   Frontal and lateral views of the chest were performed. There is no acute   skeletal abnormality. The heart size and mediastinal contours are stable,   and within normal limits. The lungs are clear, without evidence of acute   airspace consolidation, pneumothorax, or pleural effusion. However, there is   an apparent 4 mm nodular opacity overlying the midthoracic spine on the   lateral view, possibly a pulmonary nodule, new from prior exam.           Impression   1. No acute airspace consolidation.    2. 4 mm nodular opacity overlying the midthoracic spine on the lateral view,   possibly a pulmonary nodule. Recommend further characterization with a chest   CT, preferably with contrast.   The findings were sent to the Radiology Results [address] at 12:38   pm on 8/13/2021to be communicated to a licensed caregiver.                An electronic signature was used to authenticate this note.    --Harris Sanchez MD

## 2022-09-28 NOTE — TELEPHONE ENCOUNTER
[employer]([facility]) called stating that they were just wanting to verify that the CT pt has scheduled for next week would see if there was any changes from the one pt had done last year. Informed Nivia Officer that it would show changes and Dr. Stefan Caruso would be taking over the check-ups for this from now on. [employer] voiced understanding.

## 2022-09-28 NOTE — PROGRESS NOTES
MA Communication:   The following orders are received by verbal communication from Bc Simms MD    Orders include:  HST (sleep center to call pt)       CT Chest scheduled 10/6/22       31-90 day fu scheduled 4/12/23

## 2023-07-15 DIAGNOSIS — E78.00 PURE HYPERCHOLESTEROLEMIA: ICD-10-CM

## 2023-07-18 NOTE — TELEPHONE ENCOUNTER
Refill Request     CONFIRM preferrred pharmacy with the patient. If Mail Order Rx - Pend for 90 day refill. Last Seen: Last Seen Department: 3/29/2022  Last Seen by PCP: 3/29/2022    Last Written: 07/04/2022    If no future appointment scheduled, route STAFF MESSAGE with patient name to the [facility] for scheduling. Next Appointment:   No future appointments. Message sent to [address] [place] [address] to schedule appt with patient?   N/A  Pt past due for [facility]    Requested Prescriptions     Pending Prescriptions Disp Refills    atorvastatin (LIPITOR) 10 MG tablet [Pharmacy Med Name: ATORVASTATIN 10MG TABLETS] 90 tablet 3     Sig: TAKE ONE TABLET BY MOUTH EVERY DAY

## 2023-07-25 RX ORDER — ATORVASTATIN CALCIUM 10 MG/1
TABLET, FILM COATED ORAL
Qty: 90 TABLET | Refills: 3 | OUTPATIENT
Start: 2023-07-25

## 2023-09-06 DIAGNOSIS — E78.00 PURE HYPERCHOLESTEROLEMIA: ICD-10-CM

## 2023-09-07 RX ORDER — ATORVASTATIN CALCIUM 10 MG/1
TABLET, FILM COATED ORAL
Qty: 90 TABLET | Refills: 3 | Status: SHIPPED | OUTPATIENT
Start: 2023-09-07

## 2023-09-13 ENCOUNTER — OFFICE VISIT (OUTPATIENT)
Dept: FAMILY MEDICINE CLINIC | Age: 33
End: 2023-09-13
Payer: MEDICARE

## 2023-09-13 VITALS
WEIGHT: 234 LBS | SYSTOLIC BLOOD PRESSURE: 113 MMHG | BODY MASS INDEX: 38.94 KG/M2 | HEART RATE: 88 BPM | DIASTOLIC BLOOD PRESSURE: 77 MMHG | TEMPERATURE: 97 F | OXYGEN SATURATION: 97 %

## 2023-09-13 DIAGNOSIS — B34.9 VIRAL SYNDROME: Primary | ICD-10-CM

## 2023-09-13 PROCEDURE — G8427 DOCREV CUR MEDS BY ELIG CLIN: HCPCS | Performed by: FAMILY MEDICINE

## 2023-09-13 PROCEDURE — G8417 CALC BMI ABV UP PARAM F/U: HCPCS | Performed by: FAMILY MEDICINE

## 2023-09-13 PROCEDURE — 4004F PT TOBACCO SCREEN RCVD TLK: CPT | Performed by: FAMILY MEDICINE

## 2023-09-13 PROCEDURE — 99213 OFFICE O/P EST LOW 20 MIN: CPT | Performed by: FAMILY MEDICINE

## 2023-09-13 SDOH — ECONOMIC STABILITY: FOOD INSECURITY: WITHIN THE PAST 12 MONTHS, YOU WORRIED THAT YOUR FOOD WOULD RUN OUT BEFORE YOU GOT MONEY TO BUY MORE.: NEVER TRUE

## 2023-09-13 SDOH — ECONOMIC STABILITY: HOUSING INSECURITY
IN THE LAST 12 MONTHS, WAS THERE A TIME WHEN YOU DID NOT HAVE A STEADY PLACE TO SLEEP OR SLEPT IN A SHELTER (INCLUDING NOW)?: NO

## 2023-09-13 SDOH — ECONOMIC STABILITY: FOOD INSECURITY: WITHIN THE PAST 12 MONTHS, THE FOOD YOU BOUGHT JUST DIDN'T LAST AND YOU DIDN'T HAVE MONEY TO GET MORE.: NEVER TRUE

## 2023-09-13 SDOH — ECONOMIC STABILITY: INCOME INSECURITY: HOW HARD IS IT FOR YOU TO PAY FOR THE VERY BASICS LIKE FOOD, HOUSING, MEDICAL CARE, AND HEATING?: NOT HARD AT ALL

## 2023-09-13 ASSESSMENT — PATIENT HEALTH QUESTIONNAIRE - PHQ9
SUM OF ALL RESPONSES TO PHQ QUESTIONS 1-9: 15
10. IF YOU CHECKED OFF ANY PROBLEMS, HOW DIFFICULT HAVE THESE PROBLEMS MADE IT FOR YOU TO DO YOUR WORK, TAKE CARE OF THINGS AT HOME, OR GET ALONG WITH OTHER PEOPLE: 1
6. FEELING BAD ABOUT YOURSELF - OR THAT YOU ARE A FAILURE OR HAVE LET YOURSELF OR YOUR FAMILY DOWN: NEARLY EVERY DAY
10. IF YOU CHECKED OFF ANY PROBLEMS, HOW DIFFICULT HAVE THESE PROBLEMS MADE IT FOR YOU TO DO YOUR WORK, TAKE CARE OF THINGS AT HOME, OR GET ALONG WITH OTHER PEOPLE: SOMEWHAT DIFFICULT
3. TROUBLE FALLING OR STAYING ASLEEP: NEARLY EVERY DAY
9. THOUGHTS THAT YOU WOULD BE BETTER OFF DEAD, OR OF HURTING YOURSELF: NOT AT ALL
5. POOR APPETITE OR OVEREATING: NOT AT ALL
3. TROUBLE FALLING OR STAYING ASLEEP: 3
SUM OF ALL RESPONSES TO PHQ9 QUESTIONS 1 & 2: 3
5. POOR APPETITE OR OVEREATING: 0
4. FEELING TIRED OR HAVING LITTLE ENERGY: NEARLY EVERY DAY
4. FEELING TIRED OR HAVING LITTLE ENERGY: 3
SUM OF ALL RESPONSES TO PHQ QUESTIONS 1-9: 15
1. LITTLE INTEREST OR PLEASURE IN DOING THINGS: SEVERAL DAYS
2. FEELING DOWN, DEPRESSED OR HOPELESS: MORE THAN HALF THE DAYS
9. THOUGHTS THAT YOU WOULD BE BETTER OFF DEAD, OR OF HURTING YOURSELF: 0
SUM OF ALL RESPONSES TO PHQ QUESTIONS 1-9: 15
SUM OF ALL RESPONSES TO PHQ QUESTIONS 1-9: 15
7. TROUBLE CONCENTRATING ON THINGS, SUCH AS READING THE NEWSPAPER OR WATCHING TELEVISION: 3
7. TROUBLE CONCENTRATING ON THINGS, SUCH AS READING THE NEWSPAPER OR WATCHING TELEVISION: NEARLY EVERY DAY
2. FEELING DOWN, DEPRESSED OR HOPELESS: 2
6. FEELING BAD ABOUT YOURSELF - OR THAT YOU ARE A FAILURE OR HAVE LET YOURSELF OR YOUR FAMILY DOWN: 3
SUM OF ALL RESPONSES TO PHQ QUESTIONS 1-9: 15
1. LITTLE INTEREST OR PLEASURE IN DOING THINGS: 1
8. MOVING OR SPEAKING SO SLOWLY THAT OTHER PEOPLE COULD HAVE NOTICED. OR THE OPPOSITE, BEING SO FIGETY OR RESTLESS THAT YOU HAVE BEEN MOVING AROUND A LOT MORE THAN USUAL: 0
8. MOVING OR SPEAKING SO SLOWLY THAT OTHER PEOPLE COULD HAVE NOTICED. OR THE OPPOSITE - BEING SO FIDGETY OR RESTLESS THAT YOU HAVE BEEN MOVING AROUND A LOT MORE THAN USUAL: NOT AT ALL

## 2023-09-26 ENCOUNTER — HOSPITAL ENCOUNTER (EMERGENCY)
Age: 33
Discharge: HOME | End: 2023-09-26
Attending: STUDENT IN AN ORGANIZED HEALTH CARE EDUCATION/TRAINING PROGRAM
Payer: MEDICARE

## 2023-09-26 ENCOUNTER — TELEPHONE (OUTPATIENT)
Dept: FAMILY MEDICINE CLINIC | Age: 33
End: 2023-09-26

## 2023-09-26 ENCOUNTER — APPOINTMENT (OUTPATIENT)
Dept: GENERAL RADIOLOGY | Age: 33
End: 2023-09-26
Payer: MEDICARE

## 2023-09-26 VITALS
OXYGEN SATURATION: 97 % | RESPIRATION RATE: 16 BRPM | SYSTOLIC BLOOD PRESSURE: 122 MMHG | WEIGHT: 237 LBS | HEIGHT: 65 IN | BODY MASS INDEX: 39.49 KG/M2 | TEMPERATURE: 98.4 F | DIASTOLIC BLOOD PRESSURE: 81 MMHG | HEART RATE: 76 BPM

## 2023-09-26 DIAGNOSIS — E86.0 DEHYDRATION: ICD-10-CM

## 2023-09-26 DIAGNOSIS — R55 SYNCOPE AND COLLAPSE: Primary | ICD-10-CM

## 2023-09-26 LAB
ALBUMIN SERPL-MCNC: 4.2 G/DL (ref 3.4–5)
ALBUMIN/GLOB SERPL: 2 {RATIO} (ref 1.1–2.2)
ALP SERPL-CCNC: 72 U/L (ref 40–129)
ALT SERPL-CCNC: 38 U/L (ref 10–40)
ANION GAP SERPL CALCULATED.3IONS-SCNC: 9 MMOL/L (ref 3–16)
AST SERPL-CCNC: 23 U/L (ref 15–37)
BASOPHILS # BLD: 0 K/UL (ref 0–0.2)
BASOPHILS NFR BLD: 0.3 %
BILIRUB SERPL-MCNC: 0.9 MG/DL (ref 0–1)
BUN SERPL-MCNC: 13 MG/DL (ref 7–20)
CALCIUM SERPL-MCNC: 9.1 MG/DL (ref 8.3–10.6)
CHLORIDE SERPL-SCNC: 98 MMOL/L (ref 99–110)
CO2 SERPL-SCNC: 26 MMOL/L (ref 21–32)
CREAT SERPL-MCNC: 1.2 MG/DL (ref 0.9–1.3)
DEPRECATED RDW RBC AUTO: 13.3 % (ref 12.4–15.4)
EKG ATRIAL RATE: 92 BPM
EKG DIAGNOSIS: NORMAL
EKG P AXIS: 3 DEGREES
EKG P-R INTERVAL: 134 MS
EKG Q-T INTERVAL: 340 MS
EKG QRS DURATION: 72 MS
EKG QTC CALCULATION (BAZETT): 420 MS
EKG R AXIS: 9 DEGREES
EKG T AXIS: 26 DEGREES
EKG VENTRICULAR RATE: 92 BPM
EOSINOPHIL # BLD: 0 K/UL (ref 0–0.6)
EOSINOPHIL NFR BLD: 0.3 %
GFR SERPLBLD CREATININE-BSD FMLA CKD-EPI: >60 ML/MIN/{1.73_M2}
GLUCOSE SERPL-MCNC: 132 MG/DL (ref 70–99)
HCT VFR BLD AUTO: 42 % (ref 40.5–52.5)
HGB BLD-MCNC: 14.7 G/DL (ref 13.5–17.5)
LYMPHOCYTES # BLD: 1.2 K/UL (ref 1–5.1)
LYMPHOCYTES NFR BLD: 25.8 %
MCH RBC QN AUTO: 27.5 PG (ref 26–34)
MCHC RBC AUTO-ENTMCNC: 35 G/DL (ref 31–36)
MCV RBC AUTO: 78.5 FL (ref 80–100)
MONOCYTES # BLD: 0.4 K/UL (ref 0–1.3)
MONOCYTES NFR BLD: 9.1 %
NEUTROPHILS # BLD: 2.9 K/UL (ref 1.7–7.7)
NEUTROPHILS NFR BLD: 64.5 %
PLATELET # BLD AUTO: 228 K/UL (ref 135–450)
PMV BLD AUTO: 7.3 FL (ref 5–10.5)
POTASSIUM SERPL-SCNC: 3.6 MMOL/L (ref 3.5–5.1)
PROT SERPL-MCNC: 6.3 G/DL (ref 6.4–8.2)
RBC # BLD AUTO: 5.35 M/UL (ref 4.2–5.9)
SODIUM SERPL-SCNC: 133 MMOL/L (ref 136–145)
TROPONIN, HIGH SENSITIVITY: <6 NG/L (ref 0–22)
TROPONIN, HIGH SENSITIVITY: <6 NG/L (ref 0–22)
WBC # BLD AUTO: 4.6 K/UL (ref 4–11)

## 2023-09-26 PROCEDURE — 84484 ASSAY OF TROPONIN QUANT: CPT

## 2023-09-26 PROCEDURE — 93010 ELECTROCARDIOGRAM REPORT: CPT | Performed by: INTERNAL MEDICINE

## 2023-09-26 PROCEDURE — 80053 COMPREHEN METABOLIC PANEL: CPT

## 2023-09-26 PROCEDURE — 85025 COMPLETE CBC W/AUTO DIFF WBC: CPT

## 2023-09-26 PROCEDURE — 6370000000 HC RX 637 (ALT 250 FOR IP): Performed by: STUDENT IN AN ORGANIZED HEALTH CARE EDUCATION/TRAINING PROGRAM

## 2023-09-26 PROCEDURE — 99285 EMERGENCY DEPT VISIT HI MDM: CPT

## 2023-09-26 PROCEDURE — 93005 ELECTROCARDIOGRAM TRACING: CPT | Performed by: STUDENT IN AN ORGANIZED HEALTH CARE EDUCATION/TRAINING PROGRAM

## 2023-09-26 PROCEDURE — 71046 X-RAY EXAM CHEST 2 VIEWS: CPT

## 2023-09-26 PROCEDURE — 36415 COLL VENOUS BLD VENIPUNCTURE: CPT

## 2023-09-26 RX ORDER — ACETAMINOPHEN 500 MG
1000 TABLET ORAL
Status: COMPLETED | OUTPATIENT
Start: 2023-09-26 | End: 2023-09-26

## 2023-09-26 RX ADMIN — ACETAMINOPHEN 1000 MG: 500 TABLET ORAL at 15:31

## 2023-09-26 ASSESSMENT — PAIN - FUNCTIONAL ASSESSMENT
PAIN_FUNCTIONAL_ASSESSMENT: NONE - DENIES PAIN
PAIN_FUNCTIONAL_ASSESSMENT: NONE - DENIES PAIN

## 2023-09-26 ASSESSMENT — LIFESTYLE VARIABLES
HOW OFTEN DO YOU HAVE A DRINK CONTAINING ALCOHOL: NEVER
HOW MANY STANDARD DRINKS CONTAINING ALCOHOL DO YOU HAVE ON A TYPICAL DAY: PATIENT DOES NOT DRINK

## 2023-09-26 ASSESSMENT — PAIN SCALES - GENERAL: PAINLEVEL_OUTOF10: 3

## 2023-09-26 NOTE — DISCHARGE INSTRUCTIONS
Return to the nearest ED if he passes out again, develop severe chest pain, shortness of breath, other concerning symptoms. Follow-up with your PCP in 3 to 5 days.

## 2023-09-26 NOTE — TELEPHONE ENCOUNTER
fyi      Patient called in stating he has been having episodes of severe coughing that makes him pass out and foam at the mouth. I instructed him to be evaluated at the ER.  He voiced understanding

## 2023-09-26 NOTE — ED PROVIDER NOTES
Socioeconomic History    Marital status: Single     Spouse name: Not on file    Number of children: 0    Years of education: 15    Highest education level: Not on file   Occupational History    Occupation: unemployed   Tobacco Use    Smoking status: Every Day     Packs/day: 1.00     Years: 11.00     Additional pack years: 0.00     Total pack years: 11.00     Types: Cigarettes     Start date: 2018    Smokeless tobacco: Former   Vaping Use    Vaping Use: Former    Substances: Flavoring   Substance and Sexual Activity    Alcohol use: Yes     Alcohol/week: 0.0 standard drinks of alcohol    Drug use: No    Sexual activity: Not Currently     Partners: Female   Other Topics Concern    Not on file   Social History Narrative    Not on file     Social Determinants of Health     Financial Resource Strain: Low Risk  (9/13/2023)    Overall Financial Resource Strain (CARDIA)     Difficulty of Paying Living Expenses: Not hard at all   Food Insecurity: No Food Insecurity (9/13/2023)    Hunger Vital Sign     Worried About Running Out of Food in the Last Year: Never true     [address] in the Last Year: Never true   Transportation Needs: Unknown (9/13/2023)    PRAPARE - Transportation     Lack of Transportation (Medical): Not on file     Lack of Transportation (Non-Medical): No   Physical Activity: Not on file   Stress: Not on file   Social Connections: Not on file   Intimate Partner Violence: Not on file   Housing Stability: Unknown (9/13/2023)    Housing Stability Vital Sign     Unable to Pay for Housing in the Last Year: Not on file     Number of Places Lived in the Last Year: Not on file     Unstable Housing in the Last Year: No     No current facility-administered medications for this encounter.      Current Outpatient Medications   Medication Sig Dispense Refill    atorvastatin (LIPITOR) 10 MG tablet TAKE ONE TABLET BY MOUTH EVERY DAY 90 tablet 3    FLUoxetine (PROZAC) 40 MG capsule Take by mouth daily      hydrOXYzine

## 2023-10-03 ENCOUNTER — TELEPHONE (OUTPATIENT)
Dept: FAMILY MEDICINE CLINIC | Age: 33
End: 2023-10-03

## 2023-10-03 NOTE — TELEPHONE ENCOUNTER
Pt was sent to office via NT. Pt called stating that he was seen at the ER last week because he was have dizziness and blackout spells. Pt states that he was told he was dehydrated. Pt has still been having dizzy spells and blackouts since. States that when he tilts his head back he gets dizzy. If he takes a drink of something he gets choked and dizzy and that's when he sometimes blacks out. No avail appts.  Call back [employer]([facility]) with recommendations [phone]

## 2023-10-18 ENCOUNTER — OFFICE VISIT (OUTPATIENT)
Dept: FAMILY MEDICINE CLINIC | Age: 33
End: 2023-10-18
Payer: MEDICARE

## 2023-10-18 VITALS
HEART RATE: 91 BPM | OXYGEN SATURATION: 94 % | WEIGHT: 233 LBS | SYSTOLIC BLOOD PRESSURE: 111 MMHG | DIASTOLIC BLOOD PRESSURE: 76 MMHG | BODY MASS INDEX: 38.77 KG/M2

## 2023-10-18 DIAGNOSIS — F39 MOOD DISORDER (HCC): ICD-10-CM

## 2023-10-18 DIAGNOSIS — Z00.00 ANNUAL PHYSICAL EXAM: Primary | ICD-10-CM

## 2023-10-18 DIAGNOSIS — E78.2 MIXED HYPERLIPIDEMIA: ICD-10-CM

## 2023-10-18 DIAGNOSIS — Z13.220 SCREENING FOR LIPID DISORDERS: ICD-10-CM

## 2023-10-18 DIAGNOSIS — R55 NEAR SYNCOPE: ICD-10-CM

## 2023-10-18 DIAGNOSIS — E27.8 ADRENAL NODULE (HCC): ICD-10-CM

## 2023-10-18 DIAGNOSIS — F81.9 MENTAL DEVELOPMENTAL DELAY: ICD-10-CM

## 2023-10-18 PROCEDURE — 99395 PREV VISIT EST AGE 18-39: CPT | Performed by: FAMILY MEDICINE

## 2023-10-18 PROCEDURE — G8484 FLU IMMUNIZE NO ADMIN: HCPCS | Performed by: FAMILY MEDICINE

## 2023-10-18 NOTE — PROGRESS NOTES
content normal.         Judgment: Judgment normal.              ASSESSMENT PLAN      Diagnosis Orders   1. Annual physical exam        2. Mood disorder (HCC)  CBC with Auto Differential    Comprehensive Metabolic Panel      3. Adrenal nodule (HCC)  CT ABDOMEN PELVIS W WO CONTRAST Additional Contrast? None      4. Screening for lipid disorders  LIPID PANEL      5. Mixed hyperlipidemia        6. Mental developmental delay        7. Near syncope        Patient had an episode of nearly passing out last month. He was thought to be dehydrated he had had the virus when we saw him 2 weeks before. No further episodes. Neurologic work-up if this would recur. Mood appears stable his mother accompanies him today states his anger is better. They did not follow-up on the adrenal nodule give him another CAT scan order. Lipids will be monitored based upon levels requiring treatment and other cardiac risks. Medications for hyperlipidemia and hypertriglyceridemia as listed on the medication list will be changed as necessary to reach control parameters. Follow-up 1 year    Patient should call the office immediately with new or ongoing signs or symptoms or worsening, or proceed to the emergency room. No changes in past medical history, past surgical history, social history, or family history were noted during the patient encounter unless specifically listed above. All updates of past medical history, past surgical history, social history, or family history were reviewed personally by me during the office visit. All problems listed in the assessment are stable unless noted otherwise. Medication profile reviewed personally by me during the visit. Medication side effects and possible impairments from medications were discussed as applicable. Unless stated otherwise, we will continue current medications as listed in the medication review report contained in the patient's medical record.     This document was prepared by a

## 2023-10-25 ENCOUNTER — NURSE ONLY (OUTPATIENT)
Dept: FAMILY MEDICINE CLINIC | Age: 33
End: 2023-10-25
Payer: MEDICARE

## 2023-10-25 DIAGNOSIS — F39 MOOD DISORDER (HCC): ICD-10-CM

## 2023-10-25 DIAGNOSIS — Z13.220 SCREENING FOR LIPID DISORDERS: ICD-10-CM

## 2023-10-25 LAB
ALBUMIN SERPL-MCNC: 4.8 G/DL (ref 3.4–5)
ALBUMIN/GLOB SERPL: 2.4 {RATIO} (ref 1.1–2.2)
ALP SERPL-CCNC: 65 U/L (ref 40–129)
ALT SERPL-CCNC: 49 U/L (ref 10–40)
ANION GAP SERPL CALCULATED.3IONS-SCNC: 12 MMOL/L (ref 3–16)
AST SERPL-CCNC: 27 U/L (ref 15–37)
BASOPHILS # BLD: 0 K/UL (ref 0–0.2)
BASOPHILS NFR BLD: 0.4 %
BILIRUB SERPL-MCNC: 0.8 MG/DL (ref 0–1)
BUN SERPL-MCNC: 20 MG/DL (ref 7–20)
CALCIUM SERPL-MCNC: 10.1 MG/DL (ref 8.3–10.6)
CHLORIDE SERPL-SCNC: 102 MMOL/L (ref 99–110)
CHOLEST SERPL-MCNC: 160 MG/DL (ref 0–199)
CO2 SERPL-SCNC: 28 MMOL/L (ref 21–32)
CREAT SERPL-MCNC: 1.3 MG/DL (ref 0.9–1.3)
DEPRECATED RDW RBC AUTO: 13.6 % (ref 12.4–15.4)
EOSINOPHIL # BLD: 0 K/UL (ref 0–0.6)
EOSINOPHIL NFR BLD: 0.1 %
GFR SERPLBLD CREATININE-BSD FMLA CKD-EPI: >60 ML/MIN/{1.73_M2}
GLUCOSE SERPL-MCNC: 91 MG/DL (ref 70–99)
HCT VFR BLD AUTO: 46.5 % (ref 40.5–52.5)
HDLC SERPL-MCNC: 50 MG/DL (ref 40–60)
HGB BLD-MCNC: 16 G/DL (ref 13.5–17.5)
LDLC SERPL CALC-MCNC: 87 MG/DL
LYMPHOCYTES # BLD: 1.1 K/UL (ref 1–5.1)
LYMPHOCYTES NFR BLD: 27.1 %
MCH RBC QN AUTO: 27.7 PG (ref 26–34)
MCHC RBC AUTO-ENTMCNC: 34.4 G/DL (ref 31–36)
MCV RBC AUTO: 80.5 FL (ref 80–100)
MONOCYTES # BLD: 0.3 K/UL (ref 0–1.3)
MONOCYTES NFR BLD: 8 %
NEUTROPHILS # BLD: 2.6 K/UL (ref 1.7–7.7)
NEUTROPHILS NFR BLD: 64.4 %
PLATELET # BLD AUTO: 244 K/UL (ref 135–450)
PMV BLD AUTO: 8.1 FL (ref 5–10.5)
POTASSIUM SERPL-SCNC: 4.5 MMOL/L (ref 3.5–5.1)
PROT SERPL-MCNC: 6.8 G/DL (ref 6.4–8.2)
RBC # BLD AUTO: 5.77 M/UL (ref 4.2–5.9)
SODIUM SERPL-SCNC: 142 MMOL/L (ref 136–145)
TRIGL SERPL-MCNC: 117 MG/DL (ref 0–150)
VLDLC SERPL CALC-MCNC: 23 MG/DL
WBC # BLD AUTO: 4 K/UL (ref 4–11)

## 2023-10-25 PROCEDURE — 36415 COLL VENOUS BLD VENIPUNCTURE: CPT | Performed by: FAMILY MEDICINE

## 2023-11-08 ENCOUNTER — TELEPHONE (OUTPATIENT)
Dept: FAMILY MEDICINE CLINIC | Age: 33
End: 2023-11-08

## 2023-11-08 NOTE — TELEPHONE ENCOUNTER
Pt was wanting to see if he could get a letter for work. States that he went home from work today do to his Hemorid's bleeding. And they told him that he would need a letter to go back tomorrow.

## 2023-12-06 ENCOUNTER — TELEPHONE (OUTPATIENT)
Dept: FAMILY MEDICINE CLINIC | Age: 33
End: 2023-12-06

## 2023-12-06 NOTE — TELEPHONE ENCOUNTER
Pt states that he had a stomach bug yesterday and today. Feeling better but work will not let him come back to work with out a letter. Was wanting to go back tomorrow.

## 2024-02-03 ENCOUNTER — OFFICE VISIT (OUTPATIENT)
Dept: URGENT CARE | Age: 34
End: 2024-02-03

## 2024-02-03 VITALS
HEIGHT: 66 IN | HEART RATE: 93 BPM | WEIGHT: 229.6 LBS | RESPIRATION RATE: 19 BRPM | OXYGEN SATURATION: 97 % | SYSTOLIC BLOOD PRESSURE: 123 MMHG | DIASTOLIC BLOOD PRESSURE: 83 MMHG | TEMPERATURE: 98.4 F | BODY MASS INDEX: 36.9 KG/M2

## 2024-02-03 DIAGNOSIS — R11.2 NAUSEA AND VOMITING, UNSPECIFIED VOMITING TYPE: Primary | ICD-10-CM

## 2024-02-03 LAB
INFLUENZA A ANTIBODY: NEGATIVE
INFLUENZA B ANTIBODY: NEGATIVE

## 2024-02-03 NOTE — PROGRESS NOTES
SOB, and chest pain.     Notes cough is wet sounding, but is otherwise not productive. Notes last emesis was roughly 2 hours ago.  Denies blood in emesis, dark tar-like stools, and denies coffee-ground appearing emesis.    Nothing makes symptoms better.  Notes being outside makes his coughing worse.    Has taken nothing for symptoms at this time.    Pt notes having an active prescription for Zofran which he notes has not been helping with his nausea and vomiting symptoms.    Pt notes only wants Flu ruled out, notes does not want treatment unless flu is positive.      History provided by:  Patient   used: No        VITAL SIGNS  Vitals:    02/03/24 1721   BP: 123/83   Site: Left Upper Arm   Position: Sitting   Cuff Size: Large Adult   Pulse: 93   Resp: 19   Temp: 98.4 °F (36.9 °C)   TempSrc: Oral   SpO2: 97%   Weight: 104.1 kg (229 lb 9.6 oz)   Height: 1.676 m (5' 6\")       Review of Systems  See HPI for pertinent positives and negatives.    Physical Exam  Vitals reviewed.   Constitutional:       Appearance: Normal appearance.      Interventions: Face mask in place.   HENT:      Head: Normocephalic and atraumatic.      Right Ear: External ear normal.      Left Ear: External ear normal.      Nose: Nose normal.   Eyes:      Extraocular Movements: Extraocular movements intact.      Conjunctiva/sclera: Conjunctivae normal.      Pupils: Pupils are equal, round, and reactive to light.   Cardiovascular:      Rate and Rhythm: Normal rate and regular rhythm.      Heart sounds: Normal heart sounds.   Pulmonary:      Effort: Pulmonary effort is normal.      Breath sounds: Normal breath sounds.   Musculoskeletal:      Cervical back: Normal range of motion and neck supple.   Skin:     General: Skin is warm and dry.   Neurological:      Mental Status: He is alert and oriented to person, place, and time.   Psychiatric:         Attention and Perception: Attention normal.         Behavior: Behavior is cooperative.

## 2024-02-03 NOTE — PATIENT INSTRUCTIONS
In clinic Flu testing:  Influenza A: Negative.  Influenza B: Negative.    Continue taking your Zofran as prescribed to help with nausea and vomiting.  Recommend OTC treatment for symptoms:  ibuprofen (Advil, Motrin) and acetaminophen (Tylenol) for fevers and pain relief.  Can use nasal saline sprays, followed by nose blowing to help with nasal congestion  honey (1-2 teaspoons every hour) for cough.  warm teas, humidifiers, nasal lavages, and sleeping in an inclined position are also helpful options that can lessen symptoms.    Follow up with your PCP or return to the clinic in 5 days if symptoms persist or if symptoms worsen.

## 2024-04-29 ENCOUNTER — TELEPHONE (OUTPATIENT)
Dept: FAMILY MEDICINE CLINIC | Age: 34
End: 2024-04-29

## 2024-04-29 DIAGNOSIS — K62.5 RECTAL BLEEDING: Primary | ICD-10-CM

## 2024-04-29 NOTE — TELEPHONE ENCOUNTER
Patient calling c/o rectum is bleeding for a few hours today.  Currently stopped, but patient is concerned about it.  Asking what Dr. Langston recommends he do.  Please call patient and advise.

## 2024-09-26 ENCOUNTER — OFFICE VISIT (OUTPATIENT)
Dept: URGENT CARE | Age: 34
End: 2024-09-26

## 2024-09-26 VITALS
DIASTOLIC BLOOD PRESSURE: 78 MMHG | TEMPERATURE: 97.5 F | HEIGHT: 66 IN | SYSTOLIC BLOOD PRESSURE: 116 MMHG | HEART RATE: 102 BPM | WEIGHT: 217.6 LBS | OXYGEN SATURATION: 98 % | BODY MASS INDEX: 34.97 KG/M2

## 2024-09-26 DIAGNOSIS — J20.9 ACUTE BRONCHITIS, UNSPECIFIED ORGANISM: Primary | ICD-10-CM

## 2024-09-26 DIAGNOSIS — J45.909 MILD ASTHMA WITHOUT COMPLICATION, UNSPECIFIED WHETHER PERSISTENT: ICD-10-CM

## 2024-09-26 RX ORDER — BENZONATATE 200 MG/1
200 CAPSULE ORAL 3 TIMES DAILY PRN
Qty: 21 CAPSULE | Refills: 0 | Status: SHIPPED | OUTPATIENT
Start: 2024-09-26 | End: 2024-10-03

## 2024-09-26 RX ORDER — DOXYCYCLINE HYCLATE 100 MG
100 TABLET ORAL 2 TIMES DAILY
Qty: 14 TABLET | Refills: 0 | Status: SHIPPED | OUTPATIENT
Start: 2024-09-26 | End: 2024-10-03

## 2024-09-26 RX ORDER — PREDNISONE 20 MG/1
20 TABLET ORAL 2 TIMES DAILY
Qty: 10 TABLET | Refills: 0 | Status: SHIPPED | OUTPATIENT
Start: 2024-09-26 | End: 2024-10-01

## 2024-10-29 ENCOUNTER — TELEPHONE (OUTPATIENT)
Dept: FAMILY MEDICINE CLINIC | Age: 34
End: 2024-10-29

## 2024-10-29 NOTE — TELEPHONE ENCOUNTER
[phone] (Mobile)  Pt called and stated that approx half hour ago he was at [employer] drinking coffee and his vision went completely black. Pt states he felt confused and had a hard time remembering who his provider was to even call in. Pt states he feels he needs a neurology referral.   I asked pt if he lost consciousness, and pt states he is not sure.   I advise pt to call squad, and offered to call for him. Pt states that he is at work and will have a coworker call.     DIVINE

## 2024-11-09 DIAGNOSIS — E78.00 PURE HYPERCHOLESTEROLEMIA: ICD-10-CM

## 2024-11-11 RX ORDER — ATORVASTATIN CALCIUM 10 MG/1
10 TABLET, FILM COATED ORAL DAILY
Qty: 90 TABLET | Refills: 3 | Status: SHIPPED | OUTPATIENT
Start: 2024-11-11

## 2025-07-12 SDOH — HEALTH STABILITY: PHYSICAL HEALTH: ON AVERAGE, HOW MANY MINUTES DO YOU ENGAGE IN EXERCISE AT THIS LEVEL?: 60 MIN

## 2025-07-12 SDOH — HEALTH STABILITY: PHYSICAL HEALTH
ON AVERAGE, HOW MANY DAYS PER WEEK DO YOU ENGAGE IN MODERATE TO STRENUOUS EXERCISE (LIKE A BRISK WALK)?: PATIENT DECLINED

## 2025-07-14 ENCOUNTER — OFFICE VISIT (OUTPATIENT)
Dept: FAMILY MEDICINE CLINIC | Age: 35
End: 2025-07-14
Payer: COMMERCIAL

## 2025-07-14 VITALS
HEART RATE: 96 BPM | DIASTOLIC BLOOD PRESSURE: 85 MMHG | WEIGHT: 227.6 LBS | HEIGHT: 66 IN | TEMPERATURE: 97.8 F | SYSTOLIC BLOOD PRESSURE: 126 MMHG | OXYGEN SATURATION: 96 % | BODY MASS INDEX: 36.58 KG/M2 | RESPIRATION RATE: 16 BRPM

## 2025-07-14 DIAGNOSIS — Z76.89 ENCOUNTER TO ESTABLISH CARE: Primary | ICD-10-CM

## 2025-07-14 DIAGNOSIS — J45.20 MILD INTERMITTENT ASTHMA WITHOUT COMPLICATION: ICD-10-CM

## 2025-07-14 PROCEDURE — 99214 OFFICE O/P EST MOD 30 MIN: CPT

## 2025-07-14 RX ORDER — FLUTICASONE FUROATE 200 UG/1
1 POWDER RESPIRATORY (INHALATION) DAILY
Qty: 1 EACH | Refills: 3 | Status: SHIPPED | OUTPATIENT
Start: 2025-07-14

## 2025-07-14 RX ORDER — ALBUTEROL SULFATE 90 UG/1
2 INHALANT RESPIRATORY (INHALATION) EVERY 6 HOURS PRN
Qty: 1 EACH | Refills: 5 | Status: SHIPPED | OUTPATIENT
Start: 2025-07-14

## 2025-07-14 SDOH — ECONOMIC STABILITY: FOOD INSECURITY: WITHIN THE PAST 12 MONTHS, THE FOOD YOU BOUGHT JUST DIDN'T LAST AND YOU DIDN'T HAVE MONEY TO GET MORE.: NEVER TRUE

## 2025-07-14 SDOH — ECONOMIC STABILITY: FOOD INSECURITY: WITHIN THE PAST 12 MONTHS, YOU WORRIED THAT YOUR FOOD WOULD RUN OUT BEFORE YOU GOT MONEY TO BUY MORE.: NEVER TRUE

## 2025-07-14 ASSESSMENT — PATIENT HEALTH QUESTIONNAIRE - PHQ9
1. LITTLE INTEREST OR PLEASURE IN DOING THINGS: SEVERAL DAYS
8. MOVING OR SPEAKING SO SLOWLY THAT OTHER PEOPLE COULD HAVE NOTICED. OR THE OPPOSITE, BEING SO FIGETY OR RESTLESS THAT YOU HAVE BEEN MOVING AROUND A LOT MORE THAN USUAL: NOT AT ALL
10. IF YOU CHECKED OFF ANY PROBLEMS, HOW DIFFICULT HAVE THESE PROBLEMS MADE IT FOR YOU TO DO YOUR WORK, TAKE CARE OF THINGS AT HOME, OR GET ALONG WITH OTHER PEOPLE: NOT DIFFICULT AT ALL
3. TROUBLE FALLING OR STAYING ASLEEP: NOT AT ALL
5. POOR APPETITE OR OVEREATING: NOT AT ALL
6. FEELING BAD ABOUT YOURSELF - OR THAT YOU ARE A FAILURE OR HAVE LET YOURSELF OR YOUR FAMILY DOWN: NOT AT ALL
SUM OF ALL RESPONSES TO PHQ QUESTIONS 1-9: 2
4. FEELING TIRED OR HAVING LITTLE ENERGY: NOT AT ALL
SUM OF ALL RESPONSES TO PHQ QUESTIONS 1-9: 2
9. THOUGHTS THAT YOU WOULD BE BETTER OFF DEAD, OR OF HURTING YOURSELF: NOT AT ALL
SUM OF ALL RESPONSES TO PHQ QUESTIONS 1-9: 2
SUM OF ALL RESPONSES TO PHQ QUESTIONS 1-9: 2
7. TROUBLE CONCENTRATING ON THINGS, SUCH AS READING THE NEWSPAPER OR WATCHING TELEVISION: NOT AT ALL
2. FEELING DOWN, DEPRESSED OR HOPELESS: SEVERAL DAYS

## 2025-07-14 NOTE — PROGRESS NOTES
25    Chief Complaint   Patient presents with    New Patient     Establish care      HPI: Gonzalez Arora is a 34 y.o. male who presents to establish [facility]. Pt has as PMH of ADHD, bipolar disorder, anxiety/depression, insomnia, asthma, HLD. SH of wisdom teeth removal. FH of DM; Pt is adopted. Pt is vaping nicotine. Pt does not drink alcohol. No illicit drug use. Pt is not currently sexually active.     Asthma: Pt is currently prescribed fluticasone and albuterol. Pt states he rarely uses albuterol. Will refill both today. ACT is 17 today but patient has not been using fluticasone recently.     Past Medical History:   Diagnosis Date    Acid reflux     ADHD     Bipolar affect, depressed (HCC)     Hyperlipidemia     Obesity     OCD (obsessive compulsive disorder)     Schizo affective schizophrenia (HCC)     Seizures (HCC)        Past Surgical History:   Procedure Laterality Date    TOOTH EXTRACTION      UPPER GASTROINTESTINAL ENDOSCOPY         Social History     Tobacco Use    Smoking status: Former     Current packs/day: 0.00     Average packs/day: 1 pack/day for 12.4 years (12.4 ttl pk-yrs)     Types: Cigarettes     Start date:      Quit date: 2025     Years since quittin.0    Smokeless tobacco: Former    Tobacco comments:     haven't had the money lately   Vaping Use    Vaping status: Unknown   Substance Use Topics    Alcohol use: Not Currently    Drug use: No       Family History   Adopted: Yes   Problem Relation Age of Onset    Mental Illness Mother     Substance Abuse Mother     Mental Illness Father     Substance Abuse Father      Physical Exam  Constitutional:       Appearance: Normal appearance.   HENT:      Head: Normocephalic and atraumatic.   Cardiovascular:      Rate and Rhythm: Normal rate and regular rhythm.   Pulmonary:      Effort: Pulmonary effort is normal.      Breath sounds: Normal breath sounds.   Abdominal:      General: Bowel sounds are normal.      Palpations: Abdomen is